# Patient Record
Sex: MALE | Race: WHITE | NOT HISPANIC OR LATINO | Employment: FULL TIME | URBAN - METROPOLITAN AREA
[De-identification: names, ages, dates, MRNs, and addresses within clinical notes are randomized per-mention and may not be internally consistent; named-entity substitution may affect disease eponyms.]

---

## 2020-06-17 ENCOUNTER — APPOINTMENT (OUTPATIENT)
Dept: RADIOLOGY | Facility: MEDICAL CENTER | Age: 25
DRG: 439 | End: 2020-06-17
Attending: EMERGENCY MEDICINE
Payer: MEDICAID

## 2020-06-17 ENCOUNTER — HOSPITAL ENCOUNTER (INPATIENT)
Facility: MEDICAL CENTER | Age: 25
LOS: 2 days | DRG: 439 | End: 2020-06-19
Attending: EMERGENCY MEDICINE | Admitting: INTERNAL MEDICINE
Payer: MEDICAID

## 2020-06-17 ENCOUNTER — APPOINTMENT (OUTPATIENT)
Dept: RADIOLOGY | Facility: MEDICAL CENTER | Age: 25
DRG: 439 | End: 2020-06-17
Attending: INTERNAL MEDICINE
Payer: MEDICAID

## 2020-06-17 DIAGNOSIS — K85.90 ACUTE PANCREATITIS, UNSPECIFIED COMPLICATION STATUS, UNSPECIFIED PANCREATITIS TYPE: ICD-10-CM

## 2020-06-17 DIAGNOSIS — K85.90 ACUTE PANCREATITIS WITHOUT INFECTION OR NECROSIS, UNSPECIFIED PANCREATITIS TYPE: ICD-10-CM

## 2020-06-17 DIAGNOSIS — R74.01 TRANSAMINITIS: ICD-10-CM

## 2020-06-17 PROBLEM — D72.829 LEUKOCYTOSIS: Status: ACTIVE | Noted: 2020-06-17

## 2020-06-17 PROBLEM — E66.9 OBESITY: Status: ACTIVE | Noted: 2020-06-17

## 2020-06-17 PROBLEM — I10 ESSENTIAL HYPERTENSION: Status: ACTIVE | Noted: 2020-06-17

## 2020-06-17 LAB
ALBUMIN SERPL BCP-MCNC: 4.5 G/DL (ref 3.2–4.9)
ALBUMIN/GLOB SERPL: 1.7 G/DL
ALP SERPL-CCNC: 99 U/L (ref 30–99)
ALT SERPL-CCNC: 169 U/L (ref 2–50)
AMPHET UR QL SCN: NEGATIVE
ANION GAP SERPL CALC-SCNC: 12 MMOL/L (ref 7–16)
AST SERPL-CCNC: 221 U/L (ref 12–45)
BARBITURATES UR QL SCN: NEGATIVE
BASOPHILS # BLD AUTO: 0.3 % (ref 0–1.8)
BASOPHILS # BLD: 0.04 K/UL (ref 0–0.12)
BENZODIAZ UR QL SCN: NEGATIVE
BILIRUB SERPL-MCNC: 1.1 MG/DL (ref 0.1–1.5)
BUN SERPL-MCNC: 14 MG/DL (ref 8–22)
BZE UR QL SCN: NEGATIVE
CALCIUM SERPL-MCNC: 9 MG/DL (ref 8.5–10.5)
CANNABINOIDS UR QL SCN: POSITIVE
CHLORIDE SERPL-SCNC: 103 MMOL/L (ref 96–112)
CHOLEST SERPL-MCNC: 125 MG/DL (ref 100–199)
CK SERPL-CCNC: 107 U/L (ref 0–154)
CO2 SERPL-SCNC: 25 MMOL/L (ref 20–33)
CORTIS SERPL-MCNC: 18.5 UG/DL (ref 0–23)
CREAT SERPL-MCNC: 0.77 MG/DL (ref 0.5–1.4)
EKG IMPRESSION: NORMAL
EOSINOPHIL # BLD AUTO: 0.22 K/UL (ref 0–0.51)
EOSINOPHIL NFR BLD: 1.7 % (ref 0–6.9)
ERYTHROCYTE [DISTWIDTH] IN BLOOD BY AUTOMATED COUNT: 41.5 FL (ref 35.9–50)
ETHANOL BLD-MCNC: <10.1 MG/DL (ref 0–10.1)
FERRITIN SERPL-MCNC: 219 NG/ML (ref 22–322)
GLOBULIN SER CALC-MCNC: 2.6 G/DL (ref 1.9–3.5)
GLUCOSE SERPL-MCNC: 138 MG/DL (ref 65–99)
HAV IGM SERPL QL IA: NORMAL
HBV CORE IGM SER QL: NORMAL
HBV SURFACE AG SER QL: NORMAL
HCT VFR BLD AUTO: 49.6 % (ref 42–52)
HCV AB SER QL: NORMAL
HDLC SERPL-MCNC: 41 MG/DL
HGB BLD-MCNC: 16.8 G/DL (ref 14–18)
IMM GRANULOCYTES # BLD AUTO: 0.05 K/UL (ref 0–0.11)
IMM GRANULOCYTES NFR BLD AUTO: 0.4 % (ref 0–0.9)
LDLC SERPL CALC-MCNC: 68 MG/DL
LIPASE SERPL-CCNC: 2660 U/L (ref 11–82)
LYMPHOCYTES # BLD AUTO: 1.86 K/UL (ref 1–4.8)
LYMPHOCYTES NFR BLD: 14.7 % (ref 22–41)
MAGNESIUM SERPL-MCNC: 2.1 MG/DL (ref 1.5–2.5)
MCH RBC QN AUTO: 30.1 PG (ref 27–33)
MCHC RBC AUTO-ENTMCNC: 33.9 G/DL (ref 33.7–35.3)
MCV RBC AUTO: 88.9 FL (ref 81.4–97.8)
METHADONE UR QL SCN: NEGATIVE
MONOCYTES # BLD AUTO: 0.66 K/UL (ref 0–0.85)
MONOCYTES NFR BLD AUTO: 5.2 % (ref 0–13.4)
NEUTROPHILS # BLD AUTO: 9.84 K/UL (ref 1.82–7.42)
NEUTROPHILS NFR BLD: 77.7 % (ref 44–72)
NRBC # BLD AUTO: 0 K/UL
NRBC BLD-RTO: 0 /100 WBC
OPIATES UR QL SCN: NEGATIVE
OXYCODONE UR QL SCN: NEGATIVE
PCP UR QL SCN: NEGATIVE
PLATELET # BLD AUTO: 192 K/UL (ref 164–446)
PMV BLD AUTO: 12.7 FL (ref 9–12.9)
POTASSIUM SERPL-SCNC: 4 MMOL/L (ref 3.6–5.5)
PROPOXYPH UR QL SCN: NEGATIVE
PROT SERPL-MCNC: 7.1 G/DL (ref 6–8.2)
RBC # BLD AUTO: 5.58 M/UL (ref 4.7–6.1)
SODIUM SERPL-SCNC: 140 MMOL/L (ref 135–145)
TRIGL SERPL-MCNC: 80 MG/DL (ref 0–149)
TSH SERPL DL<=0.005 MIU/L-ACNC: 1.76 UIU/ML (ref 0.38–5.33)
WBC # BLD AUTO: 12.7 K/UL (ref 4.8–10.8)

## 2020-06-17 PROCEDURE — 80061 LIPID PANEL: CPT

## 2020-06-17 PROCEDURE — 76705 ECHO EXAM OF ABDOMEN: CPT

## 2020-06-17 PROCEDURE — A9270 NON-COVERED ITEM OR SERVICE: HCPCS | Performed by: EMERGENCY MEDICINE

## 2020-06-17 PROCEDURE — 83735 ASSAY OF MAGNESIUM: CPT

## 2020-06-17 PROCEDURE — 84443 ASSAY THYROID STIM HORMONE: CPT

## 2020-06-17 PROCEDURE — 700105 HCHG RX REV CODE 258: Performed by: INTERNAL MEDICINE

## 2020-06-17 PROCEDURE — 700105 HCHG RX REV CODE 258: Performed by: EMERGENCY MEDICINE

## 2020-06-17 PROCEDURE — 700102 HCHG RX REV CODE 250 W/ 637 OVERRIDE(OP): Performed by: INTERNAL MEDICINE

## 2020-06-17 PROCEDURE — 99285 EMERGENCY DEPT VISIT HI MDM: CPT

## 2020-06-17 PROCEDURE — 82550 ASSAY OF CK (CPK): CPT

## 2020-06-17 PROCEDURE — 80053 COMPREHEN METABOLIC PANEL: CPT

## 2020-06-17 PROCEDURE — 82533 TOTAL CORTISOL: CPT

## 2020-06-17 PROCEDURE — 770006 HCHG ROOM/CARE - MED/SURG/GYN SEMI*

## 2020-06-17 PROCEDURE — 74181 MRI ABDOMEN W/O CONTRAST: CPT

## 2020-06-17 PROCEDURE — 93005 ELECTROCARDIOGRAM TRACING: CPT | Performed by: EMERGENCY MEDICINE

## 2020-06-17 PROCEDURE — 80074 ACUTE HEPATITIS PANEL: CPT

## 2020-06-17 PROCEDURE — A9270 NON-COVERED ITEM OR SERVICE: HCPCS | Performed by: INTERNAL MEDICINE

## 2020-06-17 PROCEDURE — 85025 COMPLETE CBC W/AUTO DIFF WBC: CPT

## 2020-06-17 PROCEDURE — 99223 1ST HOSP IP/OBS HIGH 75: CPT | Performed by: INTERNAL MEDICINE

## 2020-06-17 PROCEDURE — 80307 DRUG TEST PRSMV CHEM ANLYZR: CPT

## 2020-06-17 PROCEDURE — 83690 ASSAY OF LIPASE: CPT

## 2020-06-17 PROCEDURE — 82728 ASSAY OF FERRITIN: CPT

## 2020-06-17 PROCEDURE — 700102 HCHG RX REV CODE 250 W/ 637 OVERRIDE(OP): Performed by: EMERGENCY MEDICINE

## 2020-06-17 RX ORDER — SODIUM CHLORIDE, SODIUM LACTATE, POTASSIUM CHLORIDE, CALCIUM CHLORIDE 600; 310; 30; 20 MG/100ML; MG/100ML; MG/100ML; MG/100ML
INJECTION, SOLUTION INTRAVENOUS CONTINUOUS
Status: DISCONTINUED | OUTPATIENT
Start: 2020-06-17 | End: 2020-06-19

## 2020-06-17 RX ORDER — AMOXICILLIN 250 MG
2 CAPSULE ORAL 2 TIMES DAILY
Status: DISCONTINUED | OUTPATIENT
Start: 2020-06-17 | End: 2020-06-19 | Stop reason: HOSPADM

## 2020-06-17 RX ORDER — SODIUM CHLORIDE 9 MG/ML
2000 INJECTION, SOLUTION INTRAVENOUS ONCE
Status: COMPLETED | OUTPATIENT
Start: 2020-06-17 | End: 2020-06-17

## 2020-06-17 RX ORDER — POLYETHYLENE GLYCOL 3350 17 G/17G
1 POWDER, FOR SOLUTION ORAL
Status: DISCONTINUED | OUTPATIENT
Start: 2020-06-17 | End: 2020-06-19 | Stop reason: HOSPADM

## 2020-06-17 RX ORDER — ACETAMINOPHEN 325 MG/1
650 TABLET ORAL EVERY 6 HOURS PRN
Status: DISCONTINUED | OUTPATIENT
Start: 2020-06-17 | End: 2020-06-19 | Stop reason: HOSPADM

## 2020-06-17 RX ORDER — PROCHLORPERAZINE EDISYLATE 5 MG/ML
5-10 INJECTION INTRAMUSCULAR; INTRAVENOUS EVERY 4 HOURS PRN
Status: DISCONTINUED | OUTPATIENT
Start: 2020-06-17 | End: 2020-06-19 | Stop reason: HOSPADM

## 2020-06-17 RX ORDER — ONDANSETRON 2 MG/ML
4 INJECTION INTRAMUSCULAR; INTRAVENOUS EVERY 4 HOURS PRN
Status: DISCONTINUED | OUTPATIENT
Start: 2020-06-17 | End: 2020-06-19 | Stop reason: HOSPADM

## 2020-06-17 RX ORDER — LABETALOL HYDROCHLORIDE 5 MG/ML
10 INJECTION, SOLUTION INTRAVENOUS EVERY 4 HOURS PRN
Status: DISCONTINUED | OUTPATIENT
Start: 2020-06-17 | End: 2020-06-19 | Stop reason: HOSPADM

## 2020-06-17 RX ORDER — BISACODYL 10 MG
10 SUPPOSITORY, RECTAL RECTAL
Status: DISCONTINUED | OUTPATIENT
Start: 2020-06-17 | End: 2020-06-19 | Stop reason: HOSPADM

## 2020-06-17 RX ORDER — ENALAPRILAT 1.25 MG/ML
1.25 INJECTION INTRAVENOUS EVERY 6 HOURS PRN
Status: DISCONTINUED | OUTPATIENT
Start: 2020-06-17 | End: 2020-06-19 | Stop reason: HOSPADM

## 2020-06-17 RX ORDER — OXYCODONE HYDROCHLORIDE 5 MG/1
5 TABLET ORAL
Status: DISCONTINUED | OUTPATIENT
Start: 2020-06-17 | End: 2020-06-19 | Stop reason: HOSPADM

## 2020-06-17 RX ORDER — OXYCODONE HYDROCHLORIDE 5 MG/1
2.5 TABLET ORAL
Status: DISCONTINUED | OUTPATIENT
Start: 2020-06-17 | End: 2020-06-19 | Stop reason: HOSPADM

## 2020-06-17 RX ORDER — PROMETHAZINE HYDROCHLORIDE 25 MG/1
12.5-25 SUPPOSITORY RECTAL EVERY 4 HOURS PRN
Status: DISCONTINUED | OUTPATIENT
Start: 2020-06-17 | End: 2020-06-19 | Stop reason: HOSPADM

## 2020-06-17 RX ORDER — CLONIDINE HYDROCHLORIDE 0.1 MG/1
0.1 TABLET ORAL EVERY 6 HOURS PRN
Status: DISCONTINUED | OUTPATIENT
Start: 2020-06-17 | End: 2020-06-19 | Stop reason: HOSPADM

## 2020-06-17 RX ORDER — ONDANSETRON 4 MG/1
4 TABLET, ORALLY DISINTEGRATING ORAL EVERY 4 HOURS PRN
Status: DISCONTINUED | OUTPATIENT
Start: 2020-06-17 | End: 2020-06-19 | Stop reason: HOSPADM

## 2020-06-17 RX ORDER — HEPARIN SODIUM 5000 [USP'U]/ML
5000 INJECTION, SOLUTION INTRAVENOUS; SUBCUTANEOUS EVERY 8 HOURS
Status: DISCONTINUED | OUTPATIENT
Start: 2020-06-17 | End: 2020-06-19 | Stop reason: HOSPADM

## 2020-06-17 RX ORDER — PROMETHAZINE HYDROCHLORIDE 25 MG/1
12.5-25 TABLET ORAL EVERY 4 HOURS PRN
Status: DISCONTINUED | OUTPATIENT
Start: 2020-06-17 | End: 2020-06-19 | Stop reason: HOSPADM

## 2020-06-17 RX ORDER — MORPHINE SULFATE 4 MG/ML
2 INJECTION, SOLUTION INTRAMUSCULAR; INTRAVENOUS
Status: DISCONTINUED | OUTPATIENT
Start: 2020-06-17 | End: 2020-06-19 | Stop reason: HOSPADM

## 2020-06-17 RX ADMIN — SODIUM CHLORIDE, POTASSIUM CHLORIDE, SODIUM LACTATE AND CALCIUM CHLORIDE: 600; 310; 30; 20 INJECTION, SOLUTION INTRAVENOUS at 16:45

## 2020-06-17 RX ADMIN — SODIUM CHLORIDE, POTASSIUM CHLORIDE, SODIUM LACTATE AND CALCIUM CHLORIDE: 600; 310; 30; 20 INJECTION, SOLUTION INTRAVENOUS at 10:12

## 2020-06-17 RX ADMIN — SODIUM CHLORIDE 2000 ML: 9 INJECTION, SOLUTION INTRAVENOUS at 10:03

## 2020-06-17 RX ADMIN — LIDOCAINE HYDROCHLORIDE 30 ML: 20 SOLUTION OROPHARYNGEAL at 06:11

## 2020-06-17 RX ADMIN — DOCUSATE SODIUM 50 MG AND SENNOSIDES 8.6 MG 2 TABLET: 8.6; 5 TABLET, FILM COATED ORAL at 10:03

## 2020-06-17 RX ADMIN — DOCUSATE SODIUM 50 MG AND SENNOSIDES 8.6 MG 2 TABLET: 8.6; 5 TABLET, FILM COATED ORAL at 21:24

## 2020-06-17 RX ADMIN — SODIUM CHLORIDE, POTASSIUM CHLORIDE, SODIUM LACTATE AND CALCIUM CHLORIDE: 600; 310; 30; 20 INJECTION, SOLUTION INTRAVENOUS at 21:23

## 2020-06-17 SDOH — ECONOMIC STABILITY: FOOD INSECURITY: WITHIN THE PAST 12 MONTHS, THE FOOD YOU BOUGHT JUST DIDN'T LAST AND YOU DIDN'T HAVE MONEY TO GET MORE.: NEVER TRUE

## 2020-06-17 SDOH — HEALTH STABILITY: MENTAL HEALTH: HOW OFTEN DO YOU HAVE A DRINK CONTAINING ALCOHOL?: MONTHLY OR LESS

## 2020-06-17 SDOH — ECONOMIC STABILITY: TRANSPORTATION INSECURITY
IN THE PAST 12 MONTHS, HAS THE LACK OF TRANSPORTATION KEPT YOU FROM MEDICAL APPOINTMENTS OR FROM GETTING MEDICATIONS?: NO

## 2020-06-17 SDOH — HEALTH STABILITY: MENTAL HEALTH: HOW OFTEN DO YOU HAVE 6 OR MORE DRINKS ON ONE OCCASION?: NEVER

## 2020-06-17 SDOH — ECONOMIC STABILITY: FOOD INSECURITY: WITHIN THE PAST 12 MONTHS, YOU WORRIED THAT YOUR FOOD WOULD RUN OUT BEFORE YOU GOT MONEY TO BUY MORE.: NEVER TRUE

## 2020-06-17 SDOH — ECONOMIC STABILITY: TRANSPORTATION INSECURITY
IN THE PAST 12 MONTHS, HAS LACK OF TRANSPORTATION KEPT YOU FROM MEETINGS, WORK, OR FROM GETTING THINGS NEEDED FOR DAILY LIVING?: NO

## 2020-06-17 SDOH — HEALTH STABILITY: MENTAL HEALTH: HOW MANY STANDARD DRINKS CONTAINING ALCOHOL DO YOU HAVE ON A TYPICAL DAY?: 1 OR 2

## 2020-06-17 ASSESSMENT — ENCOUNTER SYMPTOMS
SINUS PAIN: 0
COUGH: 0
DIZZINESS: 0
ORTHOPNEA: 0
HEADACHES: 0
PALPITATIONS: 0
SORE THROAT: 0
CHILLS: 0
NAUSEA: 0
FEVER: 0
VOMITING: 0
CONSTIPATION: 0
ABDOMINAL PAIN: 1
DIARRHEA: 0
CLAUDICATION: 0
POLYDIPSIA: 0

## 2020-06-17 ASSESSMENT — LIFESTYLE VARIABLES
HAVE PEOPLE ANNOYED YOU BY CRITICIZING YOUR DRINKING: NO
DOES PATIENT WANT TO STOP DRINKING: NO
EVER HAD A DRINK FIRST THING IN THE MORNING TO STEADY YOUR NERVES TO GET RID OF A HANGOVER: NO
EVER_SMOKED: NEVER
DO YOU DRINK ALCOHOL: NO
HAVE PEOPLE ANNOYED YOU BY CRITICIZING YOUR DRINKING: NO
TOTAL SCORE: 0
ON A TYPICAL DAY WHEN YOU DRINK ALCOHOL HOW MANY DRINKS DO YOU HAVE: 1
ALCOHOL_USE: YES
TOTAL SCORE: 0
CONSUMPTION TOTAL: INCOMPLETE
TOTAL SCORE: 0
HOW MANY TIMES IN THE PAST YEAR HAVE YOU HAD 5 OR MORE DRINKS IN A DAY: 0
DOES PATIENT WANT TO STOP DRINKING: NO
EVER FELT BAD OR GUILTY ABOUT YOUR DRINKING: NO
EVER FELT BAD OR GUILTY ABOUT YOUR DRINKING: NO
HAVE YOU EVER FELT YOU SHOULD CUT DOWN ON YOUR DRINKING: NO
HAVE YOU EVER FELT YOU SHOULD CUT DOWN ON YOUR DRINKING: NO
AVERAGE NUMBER OF DAYS PER WEEK YOU HAVE A DRINK CONTAINING ALCOHOL: 0
CONSUMPTION TOTAL: NEGATIVE
TOTAL SCORE: 0
EVER HAD A DRINK FIRST THING IN THE MORNING TO STEADY YOUR NERVES TO GET RID OF A HANGOVER: NO

## 2020-06-17 ASSESSMENT — COGNITIVE AND FUNCTIONAL STATUS - GENERAL
MOBILITY SCORE: 24
SUGGESTED CMS G CODE MODIFIER MOBILITY: CH
SUGGESTED CMS G CODE MODIFIER DAILY ACTIVITY: CH
DAILY ACTIVITIY SCORE: 24

## 2020-06-17 ASSESSMENT — COPD QUESTIONNAIRES
HAVE YOU SMOKED AT LEAST 100 CIGARETTES IN YOUR ENTIRE LIFE: NO/DON'T KNOW
DO YOU EVER COUGH UP ANY MUCUS OR PHLEGM?: NO/ONLY WITH OCCASIONAL COLDS OR INFECTIONS
DURING THE PAST 4 WEEKS HOW MUCH DID YOU FEEL SHORT OF BREATH: NONE/LITTLE OF THE TIME
COPD SCREENING SCORE: 0
IN THE PAST 12 MONTHS DO YOU DO LESS THAN YOU USED TO BECAUSE OF YOUR BREATHING PROBLEMS: DISAGREE/UNSURE
COPD SCREENING SCORE: 0
DO YOU EVER COUGH UP ANY MUCUS OR PHLEGM?: NO/ONLY WITH OCCASIONAL COLDS OR INFECTIONS
HAVE YOU SMOKED AT LEAST 100 CIGARETTES IN YOUR ENTIRE LIFE: NO/DON'T KNOW
DURING THE PAST 4 WEEKS HOW MUCH DID YOU FEEL SHORT OF BREATH: NONE/LITTLE OF THE TIME

## 2020-06-17 ASSESSMENT — PATIENT HEALTH QUESTIONNAIRE - PHQ9
SUM OF ALL RESPONSES TO PHQ QUESTIONS 1-9: 6
1. LITTLE INTEREST OR PLEASURE IN DOING THINGS: NOT AT ALL
9. THOUGHTS THAT YOU WOULD BE BETTER OFF DEAD, OR OF HURTING YOURSELF: NOT AT ALL
5. POOR APPETITE OR OVEREATING: NOT AT ALL
3. TROUBLE FALLING OR STAYING ASLEEP OR SLEEPING TOO MUCH: SEVERAL DAYS
6. FEELING BAD ABOUT YOURSELF - OR THAT YOU ARE A FAILURE OR HAVE LET YOURSELF OR YOUR FAMILY DOWN: NOT AL ALL
4. FEELING TIRED OR HAVING LITTLE ENERGY: NOT AT ALL
SUM OF ALL RESPONSES TO PHQ9 QUESTIONS 1 AND 2: 1
7. TROUBLE CONCENTRATING ON THINGS, SUCH AS READING THE NEWSPAPER OR WATCHING TELEVISION: NEARLY EVERY DAY
2. FEELING DOWN, DEPRESSED, IRRITABLE, OR HOPELESS: SEVERAL DAYS
8. MOVING OR SPEAKING SO SLOWLY THAT OTHER PEOPLE COULD HAVE NOTICED. OR THE OPPOSITE, BEING SO FIGETY OR RESTLESS THAT YOU HAVE BEEN MOVING AROUND A LOT MORE THAN USUAL: SEVERAL DAYS

## 2020-06-17 ASSESSMENT — FIBROSIS 4 INDEX: FIB4 SCORE: 2.21

## 2020-06-17 NOTE — ASSESSMENT & PLAN NOTE
-Elevated AST and ALT, with normal alkaline phosphatase and total bilirubin.  Right upper quadrant ultrasound only showed hepatomegaly and echogenic liver.   Ferritin normal  Hep panel negative   MRCP just pancreatitis . No liver lesions   Possible LUCAS besides pancreatitis ??   -Trend LFTs.  -Hydrate with IV fluids as above.  Possible GI referral if worse

## 2020-06-17 NOTE — ASSESSMENT & PLAN NOTE
- Body mass index is 48.48 kg/m²..  - Counseled on weight loss.  - outpatient referral for outpatient weight management.

## 2020-06-17 NOTE — PROGRESS NOTES
Discussed with Dr. Velazquez, 24 years old male, coming acute pancreatitis vital signs are stable mild bradycardia lipase more than 2000, liver ultrasound showed fatty liver, apparently no history of alcohol abuse  Patient is assigned to Dr. Kwon who will admit.

## 2020-06-17 NOTE — ED TRIAGE NOTES
"Chief Complaint   Patient presents with   • Abdominal Pain     since 10pm last night     Pt ambulatory to triage for above complaint. Pt was trying to sleep last night when he noticed the discomfort and pain. Pt reports \"I feel really full and distended\". Pt attempted to use the bathroom to relieve the pressure feeling. Pt denies n/v/d, sob, chest pain. Pt reports last BM was this morning.     /74   Pulse (!) 50   Temp 36.1 °C (97 °F) (Temporal)   Resp 18   Ht 1.702 m (5' 7\")   Wt (!) 140.4 kg (309 lb 8.4 oz)   SpO2 100%   BMI 48.48 kg/m²     "

## 2020-06-17 NOTE — CARE PLAN
Problem: Communication  Goal: The ability to communicate needs accurately and effectively will improve  Outcome: PROGRESSING AS EXPECTED     Problem: Safety  Goal: Will remain free from injury  Outcome: PROGRESSING AS EXPECTED  Note: Fall precautions in place. Bed in lowest position. Non-skid socks in place. Personal possessions within reach. Mobility sign on door. Bed-alarm off as patient ambulates. Call light within reach. Pt educated regarding fall prevention and states understanding.

## 2020-06-17 NOTE — ASSESSMENT & PLAN NOTE
-Likely stress related from acute pancreatitis.  No signs of infection.  Continue to trend.  Hold off on antibiotics for now.  Monitor for fevers.

## 2020-06-17 NOTE — H&P
Hospital Medicine History & Physical Note    Date of Service  6/17/2020    Primary Care Physician  No primary care provider on file.    Code Status  Full Code    Chief Complaint  Chief Complaint   Patient presents with   • Abdominal Pain     since 10pm last night       History of Presenting Illness  24 y.o. male with hypertension, but not on any medications, with no history of alcohol abuse, nondiabetic, with obesity, who presented 6/17/2020 with acute onset abdominal pain.  Patient states he was doing well until last night at around 10 PM as he was trying to go to sleep, when he started to feel bloated, with onset of progressively worsening abdominal pain in the epigastrium spreading across the abdomen, with no radiation to the back, which she further described as crampy, and rated 8 out of 10 in severity, with no associated nausea, vomiting, or bowel movement changes.  He tried to go to the bathroom 5 times, but stated he was unable to have a bowel movement, and was not able to pass gas.  He had no fevers or chills.  He had no other complaint such as chest pain, shortness of breath, cough.  The pain became unbearable prompting him to go to the ED.    Notably, he had no new medications, and has not taken any alcohol for the past several weeks.  He denies using any recreational drugs.  The only thing new is he started drinking vegetable juice which he buys from [employer] last Thursday.     ED course:  The patient was initially evaluated.  Vital signs were stable.  Initial blood work-up showed WBC of 12,700 with left shift but no bandemia.  BMP was unimpressive.  ALT was 169, with AST of 221, with alkaline phosphatase and total bilirubin normal.  Lipase was 2660.  Right upper quadrant ultrasound was obtained which only showed hepatomegaly with echogenic liver.  Patient was given normal saline and GI cocktail.  He was subsequently admitted to the hospitalist service.    Review of Systems  ROS     Pertinent  positives/negatives as mentioned above.     A complete review of systems was personally done by me. All other systems were negative.     Past Medical History   has a past medical history of Hypertension.    Surgical History  Reviewed and not pertinent.       Family History  Reviewed and not pertinent.       Social History   reports that he has never smoked. He does not have any smokeless tobacco history on file. He reports previous alcohol use. He reports previous drug use.    Allergies  No Known Allergies    Medications  None       Physical Exam  Temp:  [36.1 °C (97 °F)] 36.1 °C (97 °F)  Pulse:  [50-58] 58  Resp:  [16-18] 16  BP: (120-141)/(65-74) 120/65  SpO2:  [97 %-100 %] 97 %    Physical Exam  Vitals signs reviewed.   Constitutional:       General: He is not in acute distress.     Appearance: Normal appearance. He is obese. He is not toxic-appearing or diaphoretic.      Comments: Body mass index is 48.48 kg/m².   HENT:      Head: Normocephalic and atraumatic.      Right Ear: External ear normal.      Left Ear: External ear normal.      Mouth/Throat:      Mouth: Mucous membranes are moist.      Pharynx: No oropharyngeal exudate.   Eyes:      General: No scleral icterus.     Extraocular Movements: Extraocular movements intact.      Conjunctiva/sclera: Conjunctivae normal.      Pupils: Pupils are equal, round, and reactive to light.   Neck:      Musculoskeletal: Normal range of motion and neck supple.   Cardiovascular:      Rate and Rhythm: Normal rate and regular rhythm.      Heart sounds: Normal heart sounds. No murmur. No gallop.    Pulmonary:      Effort: Pulmonary effort is normal. No respiratory distress.      Breath sounds: Normal breath sounds. No stridor. No wheezing, rhonchi or rales.   Chest:      Chest wall: No tenderness.   Abdominal:      General: Bowel sounds are normal. There is no distension.      Palpations: Abdomen is soft. There is no mass.      Tenderness: There is abdominal tenderness (  epigastrium). There is no guarding or rebound.   Musculoskeletal: Normal range of motion.         General: No swelling.      Right lower leg: No edema.      Left lower leg: No edema.   Lymphadenopathy:      Cervical: No cervical adenopathy.   Skin:     General: Skin is warm and dry.      Coloration: Skin is not jaundiced.      Findings: No rash.   Neurological:      General: No focal deficit present.      Mental Status: He is alert and oriented to person, place, and time.      Cranial Nerves: No cranial nerve deficit.   Psychiatric:         Mood and Affect: Mood normal.         Behavior: Behavior normal.         Thought Content: Thought content normal.         Judgment: Judgment normal.         Laboratory:  Recent Labs     06/17/20  0610   WBC 12.7*   RBC 5.58   HEMOGLOBIN 16.8   HEMATOCRIT 49.6   MCV 88.9   MCH 30.1   MCHC 33.9   RDW 41.5   PLATELETCT 192   MPV 12.7     Recent Labs     06/17/20  0610   SODIUM 140   POTASSIUM 4.0   CHLORIDE 103   CO2 25   GLUCOSE 138*   BUN 14   CREATININE 0.77   CALCIUM 9.0     Recent Labs     06/17/20  0610   ALTSGPT 169*   ASTSGOT 221*   ALKPHOSPHAT 99   TBILIRUBIN 1.1   LIPASE 2660*   GLUCOSE 138*         No results for input(s): NTPROBNP in the last 72 hours.  Recent Labs     06/17/20  0610   TRIGLYCERIDE 80   HDL 41   LDL 68     No results for input(s): TROPONINT in the last 72 hours.    Imaging:  US-RUQ   Final Result         1.  Hepatomegaly and echogenic liver, compatible with fatty change versus fibrosis.      [id number]    (Results Pending)         Assessment/Plan:      * Acute pancreatitis- (present on admission)  Assessment & Plan  -Cryptogenic at this point.  No history of heavy alcohol abuse.  No gallbladder stones on ultrasound.  -I will start him on supportive care with IV fluid hydration with LR at 200 cc/h, along with analgesics with oral oxycodone, and IV morphine, and as needed antiemetics.  I will put him on bowel rest as well.  -For further work-up, I will  obtain an MRCP.  I will also check a blood alcohol level, and urine drug screen.  -Trend lipase level.    Transaminitis- (present on admission)  Assessment & Plan  -Elevated AST and ALT, with normal alkaline phosphatase and total bilirubin.  Right upper quadrant ultrasound only showed hepatomegaly and echogenic liver.  I will obtain an MRCP.  For completion, I will send for CPK, TSH, viral hepatitis panel, ferritin, and cortisol.  -Trend LFTs.  -Hydrate with IV fluids as above.    Leukocytosis- (present on admission)  Assessment & Plan  -Likely stress related from acute pancreatitis.  No signs of infection.  Continue to trend.  Hold off on antibiotics for now.  Monitor for fevers.    Obesity- (present on admission)  Assessment & Plan  - Body mass index is 48.48 kg/m²..  - Counseled on weight loss.  - outpatient referral for outpatient weight management.    Essential hypertension- (present on admission)  Assessment & Plan  -He is not on any antihypertensives.  Monitor blood pressure trend closely with as needed IV labetalol, Vasotec, and oral clonidine for significant hypertension.      DVT prophylaxis: Heparin SQ

## 2020-06-17 NOTE — ASSESSMENT & PLAN NOTE
-Cryptogenic at this point.  No history of heavy alcohol abuse.  No gallbladder stones on ultrasound.  -lipase 2660 on admission, 6/18- 268   - MRCP negative   Hep panel negative. TSH normal   Lipid panel unremarkable   u-tox cannabinoids. Possible ??   Not using pain meds   Advance diet. Continue IVF.    Consider GI referral if LFT worsen

## 2020-06-17 NOTE — ED NOTES
Med Rec completed per phone interview with Pt  Allergies reviewed  No ABX in last 14 days    Pt denies taking any RX's or OTC's

## 2020-06-17 NOTE — ED PROVIDER NOTES
ED Provider Note    CHIEF COMPLAINT  Chief Complaint   Patient presents with   • Abdominal Pain     since 10pm last night       EDGARD Goodrich is a 24 y.o. male who presents with abdominal pain.  Started last night at about 10 PM.  Maximal in the central epigastrium.  Feels like a bloating sensation.  The pain radiates throughout his abdomen.  No radiation of the back.  No chest pain or shortness of breath.  He has not had any nausea, vomiting or diarrhea.  No fevers or chills.  Normal bowel movement yesterday.  Does not feel constipated.  No dysuria hematuria frequency.  Denies drinking alcohol.  No drugs.    REVIEW OF SYSTEMS  Review of Systems   Constitutional: Negative for chills and fever.   HENT: Negative for congestion, sinus pain and sore throat.    Respiratory: Negative for cough.    Cardiovascular: Negative for chest pain, palpitations, orthopnea, claudication and leg swelling.   Gastrointestinal: Positive for abdominal pain. Negative for constipation, diarrhea, nausea and vomiting.   Genitourinary: Negative for dysuria.   Skin: Negative for rash.   Neurological: Negative for dizziness and headaches.   Endo/Heme/Allergies: Negative for polydipsia.   All other systems reviewed and are negative.        PAST MEDICAL HISTORY  Past Medical History:   Diagnosis Date   • Hypertension        SOCIAL HISTORY  Social History     Tobacco Use   • Smoking status: Never Smoker   Substance Use Topics   • Alcohol use: Not Currently   • Drug use: Not Currently       SURGICAL HISTORY  History reviewed. No pertinent surgical history.    CURRENT MEDICATIONS  Home Medications     Reviewed by Netta Bejarano R.N. (Registered Nurse) on 06/17/20 at 0528  Med List Status: Not Addressed   Medication Last Dose Status        Patient Clayton Taking any Medications                       ALLERGIES  No Known Allergies    PHYSICAL EXAM  VITAL SIGNS: /71   Pulse (!) 53   Temp 36.1 °C (97 °F) (Temporal)   Resp 16   Ht 1.702 m (5'  "7\")   Wt (!) 140.4 kg (309 lb 8.4 oz)   SpO2 99%   BMI 48.48 kg/m²    Constitutional: Awake and alert  HENT:  Atraumatic, Normocephalic.Oropharynx moist mucus membranes, Nose normal inspection.   Eyes: Normal inspection  Neck: Supple  Cardiovascular: Normal heart rate, Normal rhythm.  Symmetric peripheral pulses.   Thorax & Lungs: No respiratory distress, No wheezing, No rales, No rhonchi, No chest tenderness.   Abdomen: Bowel sounds normal, soft, non-distended, nontender, no mass  Skin: Warm, Dry, No rash.   Back: No tenderness, No CVA tenderness.   Extremities: No clubbing, cyanosis, edema, no Homans or cords   Neurologic: Grossly normal   Psychiatric: Anxious appearing    RADIOLOGY/PROCEDURES  US-RUQ   Final Result         1.  Hepatomegaly and echogenic liver, compatible with fatty change versus fibrosis.           Imaging is interpreted by radiologist    Labs:  Results for orders placed or performed during the hospital encounter of 06/17/20   CBC WITH DIFFERENTIAL   Result Value Ref Range    WBC 12.7 (H) 4.8 - 10.8 K/uL    RBC 5.58 4.70 - 6.10 M/uL    Hemoglobin 16.8 14.0 - 18.0 g/dL    Hematocrit 49.6 42.0 - 52.0 %    MCV 88.9 81.4 - 97.8 fL    MCH 30.1 27.0 - 33.0 pg    MCHC 33.9 33.7 - 35.3 g/dL    RDW 41.5 35.9 - 50.0 fL    Platelet Count 192 164 - 446 K/uL    MPV 12.7 9.0 - 12.9 fL    Neutrophils-Polys 77.70 (H) 44.00 - 72.00 %    Lymphocytes 14.70 (L) 22.00 - 41.00 %    Monocytes 5.20 0.00 - 13.40 %    Eosinophils 1.70 0.00 - 6.90 %    Basophils 0.30 0.00 - 1.80 %    Immature Granulocytes 0.40 0.00 - 0.90 %    Nucleated RBC 0.00 /100 WBC    Neutrophils (Absolute) 9.84 (H) 1.82 - 7.42 K/uL    Lymphs (Absolute) 1.86 1.00 - 4.80 K/uL    Monos (Absolute) 0.66 0.00 - 0.85 K/uL    Eos (Absolute) 0.22 0.00 - 0.51 K/uL    Baso (Absolute) 0.04 0.00 - 0.12 K/uL    Immature Granulocytes (abs) 0.05 0.00 - 0.11 K/uL    NRBC (Absolute) 0.00 K/uL   COMP METABOLIC PANEL   Result Value Ref Range    Sodium 140 135 - 145 " mmol/L    Potassium 4.0 3.6 - 5.5 mmol/L    Chloride 103 96 - 112 mmol/L    Co2 25 20 - 33 mmol/L    Anion Gap 12.0 7.0 - 16.0    Glucose 138 (H) 65 - 99 mg/dL    Bun 14 8 - 22 mg/dL    Creatinine 0.77 0.50 - 1.40 mg/dL    Calcium 9.0 8.5 - 10.5 mg/dL    AST(SGOT) 221 (H) 12 - 45 U/L    ALT(SGPT) 169 (H) 2 - 50 U/L    Alkaline Phosphatase 99 30 - 99 U/L    Total Bilirubin 1.1 0.1 - 1.5 mg/dL    Albumin 4.5 3.2 - 4.9 g/dL    Total Protein 7.1 6.0 - 8.2 g/dL    Globulin 2.6 1.9 - 3.5 g/dL    A-G Ratio 1.7 g/dL   LIPASE   Result Value Ref Range    Lipase 2660 (H) 11 - 82 U/L   ESTIMATED GFR   Result Value Ref Range    GFR If African American >60 >60 mL/min/1.73 m 2    GFR If Non African American >60 >60 mL/min/1.73 m 2   EKG (Now)   Result Value Ref Range    Report       [facility]    Test Date:  2020  Pt Name:    YARON JOHN                 Department: ER  MRN:        [id number]                      Room:       [facility]  Gender:     Male                         Technician: [id number]  :        1995                   Requested By:NOA RAZA  Order #:    [id number]                    Reading MD: NOA RAZA MD    Measurements  Intervals                                Axis  Rate:       59                           P:          16  LA:         148                          QRS:        3  QRSD:       106                          T:          6  QT:         408  QTc:        405    Interpretive Statements  SINUS BRADYCARDIA  PROBABLE LEFT VENTRICULAR HYPERTROPHY  No previous ECG available for comparison  Electronically Signed On 2020 6:37:26 PDT by NOA RAZA MD         Medications   NS infusion 2,000 mL (has no administration in time range)   hyoscyamine-maalox plus-lidocaine viscous (GI COCKTAIL) oral susp 30 mL (30 mL Oral Given 20 0611)       HYDRATION: Based on the patient's presentation of Other Pancreatitis the patient was given IV fluids. IV Hydration was  used because oral hydration was not adequate alone. Upon recheck following hydration, the patient was Stable.    COURSE & MEDICAL DECISION MAKING  Patient presents with vague epigastric abdominal pain.  His vital signs were stable.  Nonsurgical physical exam.  He was given a GI cocktail with no remarkable improvement although his symptoms are mild and he declined additional analgesic.  Laboratory data was ordered.  He is identified to have significant pancreatitis.  He does not have evidence of biliary obstruction on ultrasound.  He does not drink alcohol.  I sent a lipid panel which is pending.  The patient will be admitted to the hospital for further treatment.  Hospitalist was consulted.    FINAL IMPRESSION  1.  Acute pancreatitis      This dictation was created using voice recognition software. The accuracy of the dictation is limited to the abilities of the software.  The nursing notes were reviewed and certain aspects of this information were incorporated into this note.      Electronically signed by: Seth Velazquez M.D., 6/17/2020 6:35 AM

## 2020-06-17 NOTE — PROGRESS NOTES
Received report from ED RN and assumed care of patient. Patient is A&O x 4. Admit profile completed. Patient reports soreness in his abdomen but not actual pain. Patient declines medication.  Plan of care explained. MRI screening complete. Patient has no questions at this time. No signs of distress.Bed is in lowest, locked position, call light and belongings are within reach. Patient does not call for assistance and bed alarm is off as patient ambulates. Patient currently off floor for MRI.   All other needs met.

## 2020-06-18 LAB
ALBUMIN SERPL BCP-MCNC: 3.8 G/DL (ref 3.2–4.9)
ALP SERPL-CCNC: 149 U/L (ref 30–99)
ALT SERPL-CCNC: 591 U/L (ref 2–50)
ANION GAP SERPL CALC-SCNC: 10 MMOL/L (ref 7–16)
AST SERPL-CCNC: 275 U/L (ref 12–45)
BASOPHILS # BLD AUTO: 0.3 % (ref 0–1.8)
BASOPHILS # BLD: 0.03 K/UL (ref 0–0.12)
BILIRUB CONJ SERPL-MCNC: 0.6 MG/DL (ref 0.1–0.5)
BILIRUB INDIRECT SERPL-MCNC: 1.4 MG/DL (ref 0–1)
BILIRUB SERPL-MCNC: 2 MG/DL (ref 0.1–1.5)
BUN SERPL-MCNC: 7 MG/DL (ref 8–22)
CALCIUM SERPL-MCNC: 8.8 MG/DL (ref 8.5–10.5)
CHLORIDE SERPL-SCNC: 102 MMOL/L (ref 96–112)
CO2 SERPL-SCNC: 22 MMOL/L (ref 20–33)
CREAT SERPL-MCNC: 0.63 MG/DL (ref 0.5–1.4)
EOSINOPHIL # BLD AUTO: 0.21 K/UL (ref 0–0.51)
EOSINOPHIL NFR BLD: 1.8 % (ref 0–6.9)
ERYTHROCYTE [DISTWIDTH] IN BLOOD BY AUTOMATED COUNT: 41 FL (ref 35.9–50)
GLUCOSE SERPL-MCNC: 85 MG/DL (ref 65–99)
HCT VFR BLD AUTO: 48.9 % (ref 42–52)
HGB BLD-MCNC: 16.5 G/DL (ref 14–18)
IMM GRANULOCYTES # BLD AUTO: 0.04 K/UL (ref 0–0.11)
IMM GRANULOCYTES NFR BLD AUTO: 0.4 % (ref 0–0.9)
LIPASE SERPL-CCNC: 240 U/L (ref 11–82)
LYMPHOCYTES # BLD AUTO: 2 K/UL (ref 1–4.8)
LYMPHOCYTES NFR BLD: 17.6 % (ref 22–41)
MCH RBC QN AUTO: 29.8 PG (ref 27–33)
MCHC RBC AUTO-ENTMCNC: 33.7 G/DL (ref 33.7–35.3)
MCV RBC AUTO: 88.3 FL (ref 81.4–97.8)
MONOCYTES # BLD AUTO: 0.68 K/UL (ref 0–0.85)
MONOCYTES NFR BLD AUTO: 6 % (ref 0–13.4)
NEUTROPHILS # BLD AUTO: 8.41 K/UL (ref 1.82–7.42)
NEUTROPHILS NFR BLD: 73.9 % (ref 44–72)
NRBC # BLD AUTO: 0 K/UL
NRBC BLD-RTO: 0 /100 WBC
PLATELET # BLD AUTO: 176 K/UL (ref 164–446)
PMV BLD AUTO: 12.5 FL (ref 9–12.9)
POTASSIUM SERPL-SCNC: 3.4 MMOL/L (ref 3.6–5.5)
PROT SERPL-MCNC: 6.6 G/DL (ref 6–8.2)
RBC # BLD AUTO: 5.54 M/UL (ref 4.7–6.1)
SODIUM SERPL-SCNC: 134 MMOL/L (ref 135–145)
WBC # BLD AUTO: 11.4 K/UL (ref 4.8–10.8)

## 2020-06-18 PROCEDURE — 99233 SBSQ HOSP IP/OBS HIGH 50: CPT | Performed by: INTERNAL MEDICINE

## 2020-06-18 PROCEDURE — 85025 COMPLETE CBC W/AUTO DIFF WBC: CPT

## 2020-06-18 PROCEDURE — 770006 HCHG ROOM/CARE - MED/SURG/GYN SEMI*

## 2020-06-18 PROCEDURE — A9270 NON-COVERED ITEM OR SERVICE: HCPCS | Performed by: INTERNAL MEDICINE

## 2020-06-18 PROCEDURE — 700102 HCHG RX REV CODE 250 W/ 637 OVERRIDE(OP): Performed by: INTERNAL MEDICINE

## 2020-06-18 PROCEDURE — 36415 COLL VENOUS BLD VENIPUNCTURE: CPT

## 2020-06-18 PROCEDURE — 80076 HEPATIC FUNCTION PANEL: CPT

## 2020-06-18 PROCEDURE — 80048 BASIC METABOLIC PNL TOTAL CA: CPT

## 2020-06-18 PROCEDURE — 700105 HCHG RX REV CODE 258: Performed by: INTERNAL MEDICINE

## 2020-06-18 PROCEDURE — 83690 ASSAY OF LIPASE: CPT

## 2020-06-18 RX ADMIN — SODIUM CHLORIDE, POTASSIUM CHLORIDE, SODIUM LACTATE AND CALCIUM CHLORIDE: 600; 310; 30; 20 INJECTION, SOLUTION INTRAVENOUS at 02:23

## 2020-06-18 RX ADMIN — DOCUSATE SODIUM 50 MG AND SENNOSIDES 8.6 MG 2 TABLET: 8.6; 5 TABLET, FILM COATED ORAL at 05:43

## 2020-06-18 RX ADMIN — SODIUM CHLORIDE, POTASSIUM CHLORIDE, SODIUM LACTATE AND CALCIUM CHLORIDE: 600; 310; 30; 20 INJECTION, SOLUTION INTRAVENOUS at 08:48

## 2020-06-18 RX ADMIN — SODIUM CHLORIDE, POTASSIUM CHLORIDE, SODIUM LACTATE AND CALCIUM CHLORIDE: 600; 310; 30; 20 INJECTION, SOLUTION INTRAVENOUS at 14:30

## 2020-06-18 RX ADMIN — SODIUM CHLORIDE, POTASSIUM CHLORIDE, SODIUM LACTATE AND CALCIUM CHLORIDE: 600; 310; 30; 20 INJECTION, SOLUTION INTRAVENOUS at 20:00

## 2020-06-18 ASSESSMENT — ENCOUNTER SYMPTOMS
PALPITATIONS: 0
CHILLS: 0
VOMITING: 0
HEADACHES: 0
DIZZINESS: 0
BLURRED VISION: 0
DIARRHEA: 0
CONSTIPATION: 0
BACK PAIN: 0
HEARTBURN: 0
ABDOMINAL PAIN: 1
COUGH: 0
SORE THROAT: 0
BLOOD IN STOOL: 0
NAUSEA: 0
SHORTNESS OF BREATH: 0
FEVER: 0
DOUBLE VISION: 0
SPUTUM PRODUCTION: 0

## 2020-06-18 NOTE — PROGRESS NOTES
2 RN skin check complete with: Roberto Mejia RN.   Devices in place: PIV.  Skin assessed under devices: Yes.  Confirmed pressure ulcers found on: N/A.  New potential pressure ulcers noted on: N/A. Wound consult placed: No.  The following interventions in place: Pressure distribution mattress, 2RN skin check, pillows in place for support and positioning    Ears: intact  Elbows: intact  Chest/Back:dry patch on upper back  Lower Extremities: intact  Heels: dry, blanching

## 2020-06-18 NOTE — DIETARY
NUTRITION SERVICES: BMI - Pt with BMI >40 (=Body mass index is 48.48 kg/m².), morbid obesity. Weight loss counseling not appropriate in acute care setting. RECOMMEND - Referral to outpatient nutrition services for weight management after D/C.

## 2020-06-18 NOTE — CARE PLAN
Problem: Safety  Goal: Will remain free from falls  Outcome: PROGRESSING AS EXPECTED     Problem: Fluid Volume:  Goal: Will maintain balanced intake and output  Outcome: PROGRESSING AS EXPECTED  Note: Administer fluids per MAR.

## 2020-06-18 NOTE — PROGRESS NOTES
Hospital Medicine Daily Progress Note    Date of Service  6/18/2020    Chief Complaint  24 y.o. male admitted 6/17/2020 with abdominal pain     Hospital Course    24-year-old male past medical history of hypertension, obesity easily complaining of abdominal pain, bloating, abdominal pain at epigastrium area. he denied radiation to her back, severity 8 out of 10.  He denied nausea, vomiting or bowel movement changes.  He does not drink alcohol.  He denies drug use.  Lipase 2660 on admission.  First, ALT, AST doubled following day.  Mild leukocytosis.  MRCP negative for obstruction.  Pain did resolve 6/18.  hepatitis panel negative. Abdominal US Hepatomegaly and echogenic liver, compatible with fatty change versus fibrosis.        Interval Problem Update  Pt eager to go home. No pain. No nausea. U tox positive for cannabis. No alcohol.  LFT doubled today. Ferritin normal. Lipid cannot explain acute pancreatitis.     Consultants/Specialty  None     Code Status  Full code     Disposition  Inpatient     Review of Systems  Review of Systems   Constitutional: Negative for chills, fever and malaise/fatigue.   HENT: Negative for congestion and sore throat.    Eyes: Negative for blurred vision and double vision.   Respiratory: Negative for cough, sputum production and shortness of breath.    Cardiovascular: Negative for chest pain, palpitations and leg swelling.   Gastrointestinal: Positive for abdominal pain. Negative for blood in stool, constipation, diarrhea, heartburn, nausea and vomiting.   Genitourinary: Negative for dysuria and urgency.   Musculoskeletal: Negative for back pain.   Neurological: Negative for dizziness and headaches.        Physical Exam  Temp:  [36.7 °C (98.1 °F)-36.9 °C (98.4 °F)] 36.8 °C (98.2 °F)  Pulse:  [56-67] 60  Resp:  [14-16] 16  BP: (111-144)/(56-87) 129/79  SpO2:  [95 %-98 %] 96 %    Physical Exam  Vitals signs and nursing note reviewed.   Constitutional:       Appearance: Normal appearance.  He is obese. He is ill-appearing.   HENT:      Head: Normocephalic and atraumatic.      Right Ear: External ear normal.      Left Ear: External ear normal.   Eyes:      General: Scleral icterus present.   Cardiovascular:      Rate and Rhythm: Normal rate.      Heart sounds: No murmur.   Pulmonary:      Effort: Pulmonary effort is normal. No respiratory distress.      Breath sounds: No wheezing or rales.   Abdominal:      General: There is no distension.      Palpations: Abdomen is soft.      Tenderness: There is abdominal tenderness. There is no guarding.   Musculoskeletal:         General: No swelling or deformity.   Skin:     General: Skin is dry.      Coloration: Skin is not jaundiced.   Neurological:      General: No focal deficit present.      Mental Status: He is alert and oriented to person, place, and time.      Cranial Nerves: No cranial nerve deficit.   Psychiatric:         Mood and Affect: Mood normal.         Behavior: Behavior normal.         Thought Content: Thought content normal.         Judgment: Judgment normal.         Fluids    Intake/Output Summary (Last 24 hours) at 6/18/2020 1415  Last data filed at 6/18/2020 0630  Gross per 24 hour   Intake 2400 ml   Output --   Net 2400 ml       Laboratory  Recent Labs     06/17/20  0610 06/18/20  0246   WBC 12.7* 11.4*   RBC 5.58 5.54   HEMOGLOBIN 16.8 16.5   HEMATOCRIT 49.6 48.9   MCV 88.9 88.3   MCH 30.1 29.8   MCHC 33.9 33.7   RDW 41.5 41.0   PLATELETCT 192 176   MPV 12.7 12.5     Recent Labs     06/17/20  0610 06/18/20  0246   SODIUM 140 134*   POTASSIUM 4.0 3.4*   CHLORIDE 103 102   CO2 25 22   GLUCOSE 138* 85   BUN 14 7*   CREATININE 0.77 0.63   CALCIUM 9.0 8.8             Recent Labs     06/17/20  0610   TRIGLYCERIDE 80   HDL 41   LDL 68       Imaging  [id number]   Final Result         1. No intrahepatic or extrahepatic bile duct dilation or stone seen.      2. Unremarkable gallbladder. No gallstone.      3. Heterogeneous pancreas with  surrounding fluid, concerning for pancreatitis. Correlate with lipase.      US-RUQ   Final Result         1.  Hepatomegaly and echogenic liver, compatible with fatty change versus fibrosis.           Assessment/Plan  * Acute pancreatitis- (present on admission)  Assessment & Plan  -Cryptogenic at this point.  No history of heavy alcohol abuse.  No gallbladder stones on ultrasound.  -lipase 2660 on admission, 6/18- 268   - MRCP negative   Hep panel negative. TSH normal   Lipid panel unremarkable   u-tox cannabinoids. Possible ??   Not using pain meds   Advance diet. Continue IVF.    Consider GI referral if LFT worsen     Transaminitis- (present on admission)  Assessment & Plan  -Elevated AST and ALT, with normal alkaline phosphatase and total bilirubin.  Right upper quadrant ultrasound only showed hepatomegaly and echogenic liver.   Ferritin normal  Hep panel negative   MRCP just pancreatitis . No liver lesions   Possible LUCAS besides pancreatitis ??   -Trend LFTs.  -Hydrate with IV fluids as above.  Possible GI referral if worse     Leukocytosis- (present on admission)  Assessment & Plan  -Likely stress related from acute pancreatitis.  No signs of infection.  Continue to trend.  Hold off on antibiotics for now.  Monitor for fevers.    Obesity- (present on admission)  Assessment & Plan  - Body mass index is 48.48 kg/m²..  - Counseled on weight loss.  - outpatient referral for outpatient weight management.    Essential hypertension- (present on admission)  Assessment & Plan  conservative measurement.  Continue to follow-up with primary care       VTE prophylaxis: heparin

## 2020-06-18 NOTE — PROGRESS NOTES
Pt is A&Ox4; room air. Denies any pain or discomfort at this moment. Assessment complete. Pt is upself, and calls for help as needed. Pt is hoping to see MD this morning for them to explain recent test results. This RN informed the pt the MD would be in to see pt this morning during rounds. POC discussed with pt; all questions answered at this time. Fall precautions in place. Call light within reach. Pt educated regarding fall prevention and states understanding. Hourly rounding in place. All needs met at this moment.

## 2020-06-18 NOTE — CARE PLAN
Problem: Safety  Goal: Will remain free from injury  Outcome: PROGRESSING AS EXPECTED  Note: Fall precautions in place. Bed in lowest position. Non-skid socks in place. Personal possessions within reach. Call light within reach. Pt educated regarding fall prevention and states understanding.      Problem: Knowledge Deficit  Goal: Knowledge of disease process/condition, treatment plan, diagnostic tests, and medications will improve  Outcome: PROGRESSING AS EXPECTED  Note: Pt educated regarding plan of care and medications. All questions answered. Pt reaches out appropriately and as needed for clarification and for details on POC. Pt is well involved of POC and aware of current POC.

## 2020-06-18 NOTE — PROGRESS NOTES
Received bedside report and assumed care. Pt AOx 3, up self, continent of bladder and bowel, and on room air.  PIV in place on left AC running LR at 200 ml/hr.  Pt stated feels bloated but denies pain. VSS. Reviewed plan of care and questions answered. Pt belongings and call light in reach, bed locked/lowest position, and pt now watching TV.

## 2020-06-18 NOTE — PROGRESS NOTES
Spiritual Care Note        Patient's Name: Todd Goodrich   MRN: [id number]    YOB: 1995   Age and Gender: 24 y.o. male   Unit: Med/Neph   Room (and Bed): S630/2   Ethnicity or Nationality:    Primary Language: English   Mu-ism/Spiritual Preference: Yazidism   Place of Residence: [place]   Medical Diagnoses: acute pancreatitis  transaminitis  leukocytosis  obesity  essential hypertension   Code Status: Full Code    Date of Admission: 6/17/2020   Length of Stay: 1 day        Spiritual Screen Results:  Is your spiritual health or inner well-being important to you as you cope with your medical condition?   Yes  Would you like to receive a visit from our Spiritual Care team or your own Catholic or spiritual leader?   Yes  Was spiritual care education provided to the patient?   Declined    Sources of Hope/Gela:   Art/Music, Companionship, Nature, Meditation, Higher Power, Excercise/Physical Activity       Visited With:   Patient    Nature of the Visit:   Initial, On shift    General Visit:   Yes    Referral From/Origin of Request:   Epic nursing    Observations/Symptoms:   Patient sitting up in chair, alert, pleasant.    Interaction/Conversation:   Patient asked for prayer for himself, as they try to determine what's causing his abdominal pain, and for both of his parents, one in [place], one in [place], who are dealing with difficult situations.    Assessment:   Need    Need:   Seeking Spiritual Assistance and Support    Mu-ism Needs:   Prayer    Interventions:   compassionate presence, emotional support, prayer.    Outcomes:   Progress with Trust, Spiritual Comfort    Total Time:   10 minutes      Spiritual Care Provider Information:  Chaplain CALEB Hernandez  [phone]   [email or web address]

## 2020-06-19 ENCOUNTER — PATIENT OUTREACH (OUTPATIENT)
Dept: HEALTH INFORMATION MANAGEMENT | Facility: OTHER | Age: 25
End: 2020-06-19

## 2020-06-19 VITALS
BODY MASS INDEX: 48.58 KG/M2 | TEMPERATURE: 97.4 F | OXYGEN SATURATION: 98 % | HEIGHT: 67 IN | HEART RATE: 57 BPM | DIASTOLIC BLOOD PRESSURE: 83 MMHG | WEIGHT: 309.53 LBS | RESPIRATION RATE: 16 BRPM | SYSTOLIC BLOOD PRESSURE: 131 MMHG

## 2020-06-19 PROBLEM — K85.90 ACUTE PANCREATITIS: Status: RESOLVED | Noted: 2020-06-17 | Resolved: 2020-06-19

## 2020-06-19 PROBLEM — D72.829 LEUKOCYTOSIS: Status: RESOLVED | Noted: 2020-06-17 | Resolved: 2020-06-19

## 2020-06-19 LAB
ALBUMIN SERPL BCP-MCNC: 3.7 G/DL (ref 3.2–4.9)
ALBUMIN/GLOB SERPL: 1.4 G/DL
ALP SERPL-CCNC: 132 U/L (ref 30–99)
ALT SERPL-CCNC: 341 U/L (ref 2–50)
ANION GAP SERPL CALC-SCNC: 9 MMOL/L (ref 7–16)
AST SERPL-CCNC: 73 U/L (ref 12–45)
BASOPHILS # BLD AUTO: 0.4 % (ref 0–1.8)
BASOPHILS # BLD: 0.03 K/UL (ref 0–0.12)
BILIRUB SERPL-MCNC: 1.1 MG/DL (ref 0.1–1.5)
BUN SERPL-MCNC: 6 MG/DL (ref 8–22)
CALCIUM SERPL-MCNC: 8.9 MG/DL (ref 8.5–10.5)
CHLORIDE SERPL-SCNC: 99 MMOL/L (ref 96–112)
CO2 SERPL-SCNC: 23 MMOL/L (ref 20–33)
CREAT SERPL-MCNC: 0.69 MG/DL (ref 0.5–1.4)
EOSINOPHIL # BLD AUTO: 0.38 K/UL (ref 0–0.51)
EOSINOPHIL NFR BLD: 4.8 % (ref 0–6.9)
ERYTHROCYTE [DISTWIDTH] IN BLOOD BY AUTOMATED COUNT: 41.6 FL (ref 35.9–50)
GLOBULIN SER CALC-MCNC: 2.7 G/DL (ref 1.9–3.5)
GLUCOSE SERPL-MCNC: 78 MG/DL (ref 65–99)
HCT VFR BLD AUTO: 47 % (ref 42–52)
HGB BLD-MCNC: 15.7 G/DL (ref 14–18)
IMM GRANULOCYTES # BLD AUTO: 0.03 K/UL (ref 0–0.11)
IMM GRANULOCYTES NFR BLD AUTO: 0.4 % (ref 0–0.9)
LIPASE SERPL-CCNC: 122 U/L (ref 11–82)
LYMPHOCYTES # BLD AUTO: 2.74 K/UL (ref 1–4.8)
LYMPHOCYTES NFR BLD: 34.5 % (ref 22–41)
MCH RBC QN AUTO: 30.1 PG (ref 27–33)
MCHC RBC AUTO-ENTMCNC: 33.4 G/DL (ref 33.7–35.3)
MCV RBC AUTO: 90 FL (ref 81.4–97.8)
MONOCYTES # BLD AUTO: 0.57 K/UL (ref 0–0.85)
MONOCYTES NFR BLD AUTO: 7.2 % (ref 0–13.4)
NEUTROPHILS # BLD AUTO: 4.19 K/UL (ref 1.82–7.42)
NEUTROPHILS NFR BLD: 52.7 % (ref 44–72)
NRBC # BLD AUTO: 0 K/UL
NRBC BLD-RTO: 0 /100 WBC
PLATELET # BLD AUTO: 166 K/UL (ref 164–446)
PMV BLD AUTO: 12.9 FL (ref 9–12.9)
POTASSIUM SERPL-SCNC: 3.5 MMOL/L (ref 3.6–5.5)
PROT SERPL-MCNC: 6.4 G/DL (ref 6–8.2)
RBC # BLD AUTO: 5.22 M/UL (ref 4.7–6.1)
SODIUM SERPL-SCNC: 131 MMOL/L (ref 135–145)
WBC # BLD AUTO: 7.9 K/UL (ref 4.8–10.8)

## 2020-06-19 PROCEDURE — A9270 NON-COVERED ITEM OR SERVICE: HCPCS | Performed by: INTERNAL MEDICINE

## 2020-06-19 PROCEDURE — 700105 HCHG RX REV CODE 258: Performed by: INTERNAL MEDICINE

## 2020-06-19 PROCEDURE — 99239 HOSP IP/OBS DSCHRG MGMT >30: CPT | Performed by: INTERNAL MEDICINE

## 2020-06-19 PROCEDURE — 700102 HCHG RX REV CODE 250 W/ 637 OVERRIDE(OP): Performed by: INTERNAL MEDICINE

## 2020-06-19 PROCEDURE — 80053 COMPREHEN METABOLIC PANEL: CPT

## 2020-06-19 PROCEDURE — 85025 COMPLETE CBC W/AUTO DIFF WBC: CPT

## 2020-06-19 PROCEDURE — 83690 ASSAY OF LIPASE: CPT

## 2020-06-19 PROCEDURE — 36415 COLL VENOUS BLD VENIPUNCTURE: CPT

## 2020-06-19 RX ORDER — POTASSIUM CHLORIDE 20 MEQ/1
40 TABLET, EXTENDED RELEASE ORAL ONCE
Status: COMPLETED | OUTPATIENT
Start: 2020-06-19 | End: 2020-06-19

## 2020-06-19 RX ADMIN — POTASSIUM CHLORIDE 40 MEQ: 1500 TABLET, EXTENDED RELEASE ORAL at 09:28

## 2020-06-19 RX ADMIN — SODIUM CHLORIDE, POTASSIUM CHLORIDE, SODIUM LACTATE AND CALCIUM CHLORIDE: 600; 310; 30; 20 INJECTION, SOLUTION INTRAVENOUS at 00:44

## 2020-06-19 RX ADMIN — SODIUM CHLORIDE, POTASSIUM CHLORIDE, SODIUM LACTATE AND CALCIUM CHLORIDE: 600; 310; 30; 20 INJECTION, SOLUTION INTRAVENOUS at 05:33

## 2020-06-19 RX ADMIN — DOCUSATE SODIUM 50 MG AND SENNOSIDES 8.6 MG 2 TABLET: 8.6; 5 TABLET, FILM COATED ORAL at 05:31

## 2020-06-19 NOTE — PROGRESS NOTES
Received report and assumed care. Pt AOx 4, mobile with no assist, and on room air.  PIV in place on left AC running LR at 200 ml/hr.  Pt reporting 0/10 pain, and VSS.  Pt tolerating full liquid diet. Reviewed plan of care and questions answered. Pt belongings and call light in reach, bed locked/lowest position, and pt now resting in bed.

## 2020-06-19 NOTE — PROGRESS NOTES
Received report and assumed care of pt. Assessment complete on RA. Pt A&OX4. Denies any pain or discomfort at this time. Pt aware of discharge today. All pt needs met at this time. Safety precautions and hourly rounding in place.

## 2020-06-19 NOTE — CARE PLAN
Problem: Communication  Goal: The ability to communicate needs accurately and effectively will improve  Outcome: PROGRESSING AS EXPECTED  Note: Encouraged pt to voice feelings.     Problem: Safety  Goal: Will remain free from injury  Outcome: PROGRESSING AS EXPECTED  Note: Bed locked and in lowest position. Call light and belongings within reach.

## 2020-06-19 NOTE — DISCHARGE PLANNING
Care Transition Team Assessment  Per MD's Notes:  24 y.o. male with hypertension, but not on any medications, with no history of alcohol abuse, nondiabetic, with obesity, who presented 6/17/2020 with acute onset abdominal pain.    This case manger spoke with patient, he is alert and oriented x4, is discharging today with outpatient follow up to GI. Patient lost >100 lbs. In less than a year. He is committed to loosing weight, eats healthy and goes to the gym regularly.     PCP: [facility]  Rx: [facility]  HH: [facility]  SNF: [facility]  DME: [facility], states he had a sleep study done in [place], he was diagnosed with sleep apnea, however he has since lost >100 pounds and continues to lose weight. Patient will be revaluated for a sleep study in the future.     He lives with a couple of roommates in [place], [place]. Patient recently moved here from [place], [place] for job opportunity. 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     Patient stated he has a ride home.     Information Source  Orientation : Oriented x 4  Information Given By: Patient  Informant's Name: Todd Goodrich  Who is responsible for making decisions for patient? : Patient    Readmission Evaluation  Is this a readmission?: No    Elopement Risk  Legal Hold: No  Ambulatory or Self Mobile in Wheelchair: Yes  Disoriented: No  Psychiatric Symptoms: None  History of Wandering: No  Elopement this Admit: No  Vocalizing Wanting to Leave: No  Displays Behaviors, Body Language Wanting to Leave: No-Not at Risk for Elopement  Elopement Risk: Not at Risk for Elopement    Interdisciplinary Discharge Planning  Does Admitting Nurse Feel This Could be a Complex Discharge?: No  Primary Care Physician: Not applicable  Lives with - Patient's Self Care Capacity: Unrelated Adult  Patient or legal guardian wants to designate a caregiver (see row info): No  Support Systems: Friends / Neighbors  Housing / Facility: 3 Story Apartment / Condo  Do You Take your Prescribed Medications  Regularly: No  Reasons Why Not Taking Medications : (No medications)  Able to Return to Previous ADL's: Yes  Mobility Issues: No  Prior Services: None  Patient Expects to be Discharged to:: Home  Assistance Needed: No  Durable Medical Equipment: Not Applicable    Discharge Preparedness  What is your plan after discharge?: Home with help  What are your discharge supports?: Other (comment)(patient has roommates )  Prior Functional Level: Ambulatory, Independent with Activities of Daily Living, Independent with Medication Management  Difficulity with ADLs: None  Difficulity with IADLs: None    Functional Assesment  Prior Functional Level: Ambulatory, Independent with Activities of Daily Living, Independent with Medication Management    Finances  Financial Barriers to Discharge: No  Prescription Coverage: Yes    Vision / Hearing Impairment  Vision Impairment : No  Hearing Impairment : Yes  Hearing Impairment: Both Ears(slight)  Does Pt Need Special Equipment for the Hearing Impaired?: No       Advance Directive  Advance Directive?: None  Advance Directive offered?: AD Booklet refused    Domestic Abuse  Have you ever been the victim of abuse or violence?: No  Physical Abuse or Sexual Abuse: No  Verbal Abuse or Emotional Abuse: No  Possible Abuse Reported to:: Not Applicable    Psychological Assessment  History of Substance Abuse: Marijuana  Date Last Used - Marijuana: (day of admission)  History of Psychiatric Problems: No  Non-compliant with Treatment: No  Newly Diagnosed Illness: Yes    Discharge Risks or Barriers  Discharge risks or barriers?: No  Patient risk factors: Bariatric    Anticipated Discharge Information  Anticipated discharge disposition: Home  Discharge Address: [facility] [address] [address]  Discharge Contact Phone Number: [phone]    No needs from case management on discharge    Carole Ravi RN,

## 2020-06-19 NOTE — DISCHARGE INSTRUCTIONS
Discharge Instructions per Abbie Nguyen M.D.  Continue healthy diet, exercising   follow up if interested at [facility] resident clinic [phone] for primary care   Follow up with GI, someone will call for apt     DIET: healthy, low fat     ACTIVITY: as tolerated     DIAGNOSIS: pancreatitis     Return to ER if worsening abdominal pain, nausea, vomiting, unusual bleeding, severe headache, severe chest pain, shortness of breath       Discharge Instructions    Discharged to home by car with self. Discharged via walking, hospital escort: Yes.  Special equipment needed: Not Applicable    Be sure to schedule a follow-up appointment with your primary care doctor or any specialists as instructed.     Discharge Plan:   Diet Plan: Discussed  Activity Level: Discussed  Confirmed Follow up Appointment: Patient to Call and Schedule Appointment  Confirmed Symptoms Management: Discussed  Medication Reconciliation Updated: Yes    I understand that a diet low in cholesterol, fat, and sodium is recommended for good health. Unless I have been given specific instructions below for another diet, I accept this instruction as my diet prescription.   Other diet: Healthy low fat    Special Instructions: None    · Is patient discharged on Warfarin / Coumadin?   No     Low-Fat Diet for Pancreatitis or Gallbladder Conditions  A low-fat diet can be helpful if you have pancreatitis or a gallbladder condition. With these conditions, your pancreas and gallbladder have trouble digesting fats. A healthy eating plan with less fat will help rest your pancreas and gallbladder and reduce your symptoms.  What do I need to know about this diet?  · Eat a low-fat diet.  ¨ Reduce your fat intake to less than 20-30% of your total daily calories. This is less than 50-60 g of fat per day.  ¨ Remember that you need some fat in your diet. Ask your dietician what your daily goal should be.  ¨ Choose nonfat and low-fat healthy foods. Look for the words “nonfat,”  “low fat,” or “fat free.”  ¨ As a guide, look on the label and choose foods with less than 3 g of fat per serving. Eat only one serving.  · Avoid alcohol.  · Do not smoke. If you need help quitting, talk with your health care provider.  · Eat small frequent meals instead of three large heavy meals.  What foods can I eat?  Grains   Include healthy grains and starches such as potatoes, wheat bread, fiber-rich cereal, and brown rice. Choose whole grain options whenever possible. In adults, whole grains should account for 45-65% of your daily calories.  Fruits and Vegetables   Eat plenty of fruits and vegetables. Fresh fruits and vegetables add fiber to your diet.  Meats and Other Protein Sources   Eat lean meat such as chicken and pork. Trim any fat off of meat before cooking it. Eggs, fish, and beans are other sources of protein. In adults, these foods should account for 10-35% of your daily calories.  Dairy   Choose low-fat milk and dairy options. Dairy includes fat and protein, as well as calcium.  Fats and Oils   Limit high-fat foods such as fried foods, sweets, baked goods, sugary drinks.  Other   Creamy sauces and condiments, such as mayonnaise, can add extra fat. Think about whether or not you need to use them, or use smaller amounts or low fat options.  What foods are not recommended?  · High fat foods, such as:  ¨ Baked goods.  ¨ Ice cream.  ¨ Congolese toast.  ¨ Sweet rolls.  ¨ Pizza.  ¨ Cheese bread.  ¨ Foods covered with batter, butter, creamy sauces, or cheese.  ¨ Fried foods.  ¨ Sugary drinks and desserts.  · Foods that cause gas or bloating  This information is not intended to replace advice given to you by your health care provider. Make sure you discuss any questions you have with your health care provider.  Document Released: 12/23/2014 Document Revised: 05/25/2017 Document Reviewed: 12/01/2014  Elsevier Interactive Patient Education © 2017 [employer]    Acute Pancreatitis  Introduction  Acute  pancreatitis is a condition in which the pancreas suddenly gets irritated and swollen (has inflammation). The pancreas is a large gland behind the stomach. It makes enzymes that help to digest food. The pancreas also makes hormones that help to control your blood sugar. Acute pancreatitis happens when the enzymes attack the pancreas and damage it. Most attacks last a couple of days and can cause serious problems.  Follow these instructions at home:  Eating and drinking  · Follow instructions from your doctor about diet. You may need to:  ¨ Avoid alcohol.  ¨ Limit how much fat is in your diet.  · Eat small meals often. Avoid eating big meals.  · Drink enough fluid to keep your pee (urine) clear or pale yellow.  · Do not drink alcohol if it caused your condition.  General instructions  · Take over-the-counter and prescription medicines only as told by your doctor.  · Do not use any tobacco products. These include cigarettes, chewing tobacco, and e-cigarettes. If you need help quitting, ask your doctor.  · Get plenty of rest.  · If directed, check your blood sugar at home as told by your doctor.  · Keep all follow-up visits as told by your doctor. This is important.  Contact a doctor if:  · You do not get better as quickly as expected.  · You have new symptoms.  · Your symptoms get worse.  · You have lasting pain or weakness.  · You continue to feel sick to your stomach (nauseous).  · You get better and then you have another pain attack.  · You have a fever.  Get help right away if:  · You cannot eat or keep fluids down.  · Your pain becomes very bad.  · Your skin or the white part of your eyes turns yellow (jaundice).  · You throw up (vomit).  · You feel dizzy or you pass out (faint).  · Your blood sugar is high (over 300 mg/dL).  This information is not intended to replace advice given to you by your health care provider. Make sure you discuss any questions you have with your health care provider.  Document Released:  06/05/2009 Document Revised: 05/25/2017 Document Reviewed: 09/20/2016  © 2017 [employer]      Depression / Suicide Risk    As you are discharged from [facility] facility, it is important to learn how to keep safe from harming yourself.    Recognize the warning signs:  · Abrupt changes in personality, positive or negative- including increase in energy   · Giving away possessions  · Change in eating patterns- significant weight changes-  positive or negative  · Change in sleeping patterns- unable to sleep or sleeping all the time   · Unwillingness or inability to communicate  · Depression  · Unusual sadness, discouragement and loneliness  · Talk of wanting to die  · Neglect of personal appearance   · Rebelliousness- reckless behavior  · Withdrawal from people/activities they love  · Confusion- inability to concentrate     If you or a loved one observes any of these behaviors or has concerns about self-harm, here's what you can do:  · Talk about it- your feelings and reasons for harming yourself  · Remove any means that you might use to hurt yourself (examples: pills, rope, extension cords, firearm)  · Get professional help from the community (Mental Health, Substance Abuse, psychological counseling)  · Do not be alone:Call your Safe Contact- someone whom you trust who will be there for you.  · Call your local CRISIS HOTLINE 490-6321 or 439-468-7642  · Call your local Children's Mobile Crisis Response Team [place] (387) 539-3497 or www.PassportParking  · Call the toll free National Suicide Prevention Hotlines   · National Suicide Prevention Lifeline 059-408-SRXL (6672)  · National Hope Line Network 800-SUICIDE (457-4197)

## 2020-06-19 NOTE — PROGRESS NOTES
Pt discharged home. IV's removed. Pt left unit via walking with CNA escort. Personal belongings with pt when leaving unit. Pt given discharge instructions prior to leaving unit including when to visit with physician; verbalizes understanding. Pt given 's note to return to work. Copy of discharge instructions with pt and in the chart.

## 2020-06-19 NOTE — DISCHARGE SUMMARY
Discharge Summary    CHIEF COMPLAINT ON ADMISSION  Chief Complaint   Patient presents with   • Abdominal Pain     since 10pm last night       Reason for Admission  Abd pain     Admission Date  6/17/2020    CODE STATUS  Full Code    HPI & HOSPITAL COURSE     24-year-old male past medical history of hypertension, obesity easily complaining of abdominal pain, bloating, abdominal pain at epigastrium area. he denied radiation to her back, severity 8 out of 10.  He denied nausea, vomiting or bowel movement changes.  He does not drink alcohol.  He denies drug use.  Lipase 2660 on admission.  First, ALT, AST doubled following day.lipid panel not explain pancreatitis. Mild leukocytosis.  MRCP negative for obstruction.  Pain did resolve 6/18.  hepatitis panel negative. Abdominal US Hepatomegaly and echogenic liver, compatible with fatty change versus fibrosis. LFTs did improve with IVF. Diet was advance. Etiology unclear of pancreatitis. I did refer pt to follow up with GI as OP. Healthy diet. Patient insisting to go home and he did request to follow up as OP .     Therefore, he is discharged in good and stable condition to home with close outpatient follow-up.    The patient met 2-midnight criteria for an inpatient stay at the time of discharge.    Discharge Date  06/19/2020    FOLLOW UP ITEMS POST DISCHARGE  None     DISCHARGE DIAGNOSES  Principal Problem (Resolved):    Acute pancreatitis POA: Yes  Active Problems:    Transaminitis POA: Yes    Essential hypertension POA: Yes    Obesity POA: Yes  Resolved Problems:    Leukocytosis POA: Yes      FOLLOW UP  No future appointments.  [address] [address] [phone]          MEDICATIONS ON DISCHARGE     Medication List      You have not been prescribed any medications.         Allergies  No Known Allergies    DIET  Orders Placed This Encounter   Procedures   • Diet Order Low Fiber(GI Soft)     Standing Status:   Standing      Number of Occurrences:   1     Order Specific Question:   Diet:     Answer:   Low Fiber(GI Soft) [2]       ACTIVITY  As tolerated.  Weight bearing as tolerated    CONSULTATIONS  None     PROCEDURES  None     LABORATORY  Lab Results   Component Value Date    SODIUM 131 (L) 06/19/2020    POTASSIUM 3.5 (L) 06/19/2020    CHLORIDE 99 06/19/2020    CO2 23 06/19/2020    GLUCOSE 78 06/19/2020    BUN 6 (L) 06/19/2020    CREATININE 0.69 06/19/2020        Lab Results   Component Value Date    WBC 7.9 06/19/2020    HEMOGLOBIN 15.7 06/19/2020    HEMATOCRIT 47.0 06/19/2020    PLATELETCT 166 06/19/2020        Total time of the discharge process exceeds 44 minutes.

## 2020-11-10 ENCOUNTER — APPOINTMENT (OUTPATIENT)
Dept: RADIOLOGY | Facility: MEDICAL CENTER | Age: 25
End: 2020-11-10
Attending: EMERGENCY MEDICINE
Payer: MEDICAID

## 2020-11-10 ENCOUNTER — HOSPITAL ENCOUNTER (EMERGENCY)
Facility: MEDICAL CENTER | Age: 25
End: 2020-11-10
Attending: EMERGENCY MEDICINE
Payer: MEDICAID

## 2020-11-10 VITALS
TEMPERATURE: 96.9 F | RESPIRATION RATE: 16 BRPM | BODY MASS INDEX: 43.95 KG/M2 | HEART RATE: 83 BPM | WEIGHT: 290 LBS | OXYGEN SATURATION: 93 % | DIASTOLIC BLOOD PRESSURE: 61 MMHG | HEIGHT: 68 IN | SYSTOLIC BLOOD PRESSURE: 123 MMHG

## 2020-11-10 DIAGNOSIS — Z71.89 ADVICE GIVEN ABOUT COVID-19 VIRUS INFECTION: Primary | ICD-10-CM

## 2020-11-10 DIAGNOSIS — B34.9 VIRAL ILLNESS: ICD-10-CM

## 2020-11-10 LAB
ANION GAP SERPL CALC-SCNC: 12 MMOL/L (ref 7–16)
BASOPHILS # BLD AUTO: 0 % (ref 0–1.8)
BASOPHILS # BLD: 0 K/UL (ref 0–0.12)
BUN SERPL-MCNC: 12 MG/DL (ref 8–22)
CALCIUM SERPL-MCNC: 8.7 MG/DL (ref 8.5–10.5)
CHLORIDE SERPL-SCNC: 103 MMOL/L (ref 96–112)
CO2 SERPL-SCNC: 26 MMOL/L (ref 20–33)
COVID ORDER STATUS COVID19: NORMAL
CREAT SERPL-MCNC: 1.05 MG/DL (ref 0.5–1.4)
EOSINOPHIL # BLD AUTO: 0.02 K/UL (ref 0–0.51)
EOSINOPHIL NFR BLD: 0.4 % (ref 0–6.9)
ERYTHROCYTE [DISTWIDTH] IN BLOOD BY AUTOMATED COUNT: 41.1 FL (ref 35.9–50)
GLUCOSE SERPL-MCNC: 95 MG/DL (ref 65–99)
HCT VFR BLD AUTO: 54.3 % (ref 42–52)
HGB BLD-MCNC: 17.9 G/DL (ref 14–18)
IMM GRANULOCYTES # BLD AUTO: 0.02 K/UL (ref 0–0.11)
IMM GRANULOCYTES NFR BLD AUTO: 0.4 % (ref 0–0.9)
LYMPHOCYTES # BLD AUTO: 0.7 K/UL (ref 1–4.8)
LYMPHOCYTES NFR BLD: 15.1 % (ref 22–41)
MCH RBC QN AUTO: 29.1 PG (ref 27–33)
MCHC RBC AUTO-ENTMCNC: 33 G/DL (ref 33.7–35.3)
MCV RBC AUTO: 88.3 FL (ref 81.4–97.8)
MONOCYTES # BLD AUTO: 0.39 K/UL (ref 0–0.85)
MONOCYTES NFR BLD AUTO: 8.4 % (ref 0–13.4)
NEUTROPHILS # BLD AUTO: 3.51 K/UL (ref 1.82–7.42)
NEUTROPHILS NFR BLD: 75.7 % (ref 44–72)
NRBC # BLD AUTO: 0 K/UL
NRBC BLD-RTO: 0 /100 WBC
PLATELET # BLD AUTO: 105 K/UL (ref 164–446)
PMV BLD AUTO: 13 FL (ref 9–12.9)
POTASSIUM SERPL-SCNC: 4 MMOL/L (ref 3.6–5.5)
RBC # BLD AUTO: 6.15 M/UL (ref 4.7–6.1)
SODIUM SERPL-SCNC: 141 MMOL/L (ref 135–145)
WBC # BLD AUTO: 4.6 K/UL (ref 4.8–10.8)

## 2020-11-10 PROCEDURE — U0003 INFECTIOUS AGENT DETECTION BY NUCLEIC ACID (DNA OR RNA); SEVERE ACUTE RESPIRATORY SYNDROME CORONAVIRUS 2 (SARS-COV-2) (CORONAVIRUS DISEASE [COVID-19]), AMPLIFIED PROBE TECHNIQUE, MAKING USE OF HIGH THROUGHPUT TECHNOLOGIES AS DESCRIBED BY CMS-2020-01-R: HCPCS

## 2020-11-10 PROCEDURE — 700102 HCHG RX REV CODE 250 W/ 637 OVERRIDE(OP): Performed by: EMERGENCY MEDICINE

## 2020-11-10 PROCEDURE — 99285 EMERGENCY DEPT VISIT HI MDM: CPT

## 2020-11-10 PROCEDURE — 36415 COLL VENOUS BLD VENIPUNCTURE: CPT

## 2020-11-10 PROCEDURE — 700111 HCHG RX REV CODE 636 W/ 250 OVERRIDE (IP): Performed by: EMERGENCY MEDICINE

## 2020-11-10 PROCEDURE — 80048 BASIC METABOLIC PNL TOTAL CA: CPT

## 2020-11-10 PROCEDURE — 85025 COMPLETE CBC W/AUTO DIFF WBC: CPT

## 2020-11-10 PROCEDURE — 700105 HCHG RX REV CODE 258: Performed by: EMERGENCY MEDICINE

## 2020-11-10 PROCEDURE — A9270 NON-COVERED ITEM OR SERVICE: HCPCS | Performed by: EMERGENCY MEDICINE

## 2020-11-10 PROCEDURE — 96374 THER/PROPH/DIAG INJ IV PUSH: CPT

## 2020-11-10 PROCEDURE — 71045 X-RAY EXAM CHEST 1 VIEW: CPT

## 2020-11-10 RX ORDER — BENZONATATE 100 MG/1
100 CAPSULE ORAL 3 TIMES DAILY PRN
Qty: 30 CAP | Refills: 0 | Status: SHIPPED | OUTPATIENT
Start: 2020-11-10

## 2020-11-10 RX ORDER — ONDANSETRON 2 MG/ML
4 INJECTION INTRAMUSCULAR; INTRAVENOUS ONCE
Status: COMPLETED | OUTPATIENT
Start: 2020-11-10 | End: 2020-11-10

## 2020-11-10 RX ORDER — ONDANSETRON 4 MG/1
4 TABLET, ORALLY DISINTEGRATING ORAL EVERY 6 HOURS PRN
Qty: 10 TAB | Refills: 0 | Status: SHIPPED | OUTPATIENT
Start: 2020-11-10

## 2020-11-10 RX ORDER — SODIUM CHLORIDE 9 MG/ML
1000 INJECTION, SOLUTION INTRAVENOUS ONCE
Status: COMPLETED | OUTPATIENT
Start: 2020-11-10 | End: 2020-11-10

## 2020-11-10 RX ORDER — IBUPROFEN 600 MG/1
600 TABLET ORAL ONCE
Status: COMPLETED | OUTPATIENT
Start: 2020-11-10 | End: 2020-11-10

## 2020-11-10 RX ORDER — BENZONATATE 100 MG/1
200 CAPSULE ORAL ONCE
Status: COMPLETED | OUTPATIENT
Start: 2020-11-10 | End: 2020-11-10

## 2020-11-10 RX ADMIN — SODIUM CHLORIDE 1000 ML: 9 INJECTION, SOLUTION INTRAVENOUS at 22:05

## 2020-11-10 RX ADMIN — BENZONATATE 200 MG: 100 CAPSULE ORAL at 22:16

## 2020-11-10 RX ADMIN — ONDANSETRON 4 MG: 2 INJECTION INTRAMUSCULAR; INTRAVENOUS at 22:16

## 2020-11-10 RX ADMIN — IBUPROFEN 600 MG: 600 TABLET, FILM COATED ORAL at 21:55

## 2020-11-10 ASSESSMENT — FIBROSIS 4 INDEX: FIB4 SCORE: 0.62

## 2020-11-10 NOTE — LETTER
11/11/2020               Todd Goodrich  [address]  [address]  [place] [id number]        Dear Todd (MR#[id number]),    This letter is to inform you that your COVID-19 test result is POSITIVE.  This means that the virus that causes COVID-19 was found in your sample.      SARS-CoV-2 Source   Date Value Ref Range Status   11/10/2020 NP Swab  Final     SARS-CoV-2 by PCR   Date Value Ref Range Status   11/10/2020 DETECTED (AA)  Final     Comment:     **The TaqPath COVID-19 SARS-CoV-2 test has been made available for use under  the Emergency Use Authorization (EUA) only.         The Health Department will be in contact with you soon.  You are encouraged to continue to quarantine and self-isolate according to the CDC guidance unless otherwise directed by the Health Department.  Per the CDC, you should continue to quarantine until at least 10 days have passed since your symptoms first appeared and at least 24 hours have passed since your fever resolved without the use of fever-reducing medications. Your other symptoms of COVID-19 should also be improving (loss of taste and smell may persist for weeks or months after recovery and should not delay the end of isolation).  You are advised to return to the ER for worsening symptoms including difficulty breathing, ongoing fever, weakness or chest pain.    Once any symptoms have resolved, it may be possible to donate plasma to help others that are currently ill with COVID-19. To learn more and apply, please contact the  at [phone] or via e-mail at [email or web address].    For any further questions regarding COVID-19, please contact the [facility] at [phone].  Thank you for your cooperation in the matter.      Sincerely,    [facility] Care Team

## 2020-11-11 LAB
SARS-COV-2 RNA RESP QL NAA+PROBE: DETECTED
SPECIMEN SOURCE: ABNORMAL

## 2020-11-11 NOTE — ED TRIAGE NOTES
Todd Goodrich  25 y.o.  Chief Complaint   Patient presents with   • Flu Like Symptoms     pt c/o cough and loss of smell x 1 week; also c/o nausea but no vomiting; pt also c/o body aches and fever (unsure of how high)   last took motrin last am

## 2020-11-11 NOTE — ED NOTES
COVID-19 Test Follow-Up  11/11/20    Patient is positive for COVID-19.      SARS-CoV-2 Source   Date Value Ref Range Status   11/10/2020 NP Swab  Final     SARS-CoV-2 by PCR   Date Value Ref Range Status   11/10/2020 DETECTED (AA)  Final     Comment:     **The TaqPath COVID-19 SARS-CoV-2 test has been made available for use under  the Emergency Use Authorization (EUA) only.       I attempted to contact the patient by phone, but there was no answer. Left a message to return call for results. I have informed the patient of the positive result by letter and that the Health Dept would be in contact soon. Instructed them to continue to quarantine and self-isolate according with the CDC guidance or as otherwise directed by the Health Dept.    Per the CDC, they should continue to self-isolate until:   • At least 10 days since symptoms first appeared and  • At least 24 hours with no fever without fever-reducing medication and  • Other symptoms of COVID-19 are improving (Loss of taste and smell may persist for weeks or months after recovery and need not delay the end of isolation)    They are advised to return to the ER for worsening symptoms including difficulty breathing, ongoing fever, weakness or chest pain.    Diana Baxter, PharmD

## 2020-11-11 NOTE — DISCHARGE INSTRUCTIONS
Your evaluation here was reassuring.  Your symptoms strongly suggest COVID-19 infection.  Please quarantine until you get the results tomorrow.  Please read the attached information about COVID-19 discharge instructions.  For aches and pains and fevers, use ibuprofen.  We have prescribed you medication for nausea and for cough at home.  Return immediately if you have severe shortness of breath.

## 2020-11-11 NOTE — ED PROVIDER NOTES
"ED Provider Note    Scribed for Jc Quintana M.D. by Jc Quintana M.D.. 11/10/2020,  9:53 PM.    CHIEF COMPLAINT  Chief Complaint   Patient presents with   • Flu Like Symptoms     pt c/o cough and loss of smell x 1 week; also c/o nausea but no vomiting; pt also c/o body aches and fever (unsure of how high)       HPI  Todd Goodrich is a 25 y.o. male who presents to the Emergency Department for flulike symptoms for the past week.  He reports a dry cough, fevers, body aches, and nausea.  He has not had vomiting or diarrhea.  He has not had chest pain or shortness of breath.  He denies any history of cardiac or pulmonary disease.  He has a fever of 100.4 on arrival, and presented with tachycardia to 118 bpm, though this has resolved at the bedside without intervention.    REVIEW OF SYSTEMS  See HPI for further details. All other systems are negative.     PAST MEDICAL HISTORY   has a past medical history of Hypertension.    SOCIAL HISTORY  Social History     Tobacco Use   • Smoking status: Never Smoker   • Smokeless tobacco: Never Used   Substance and Sexual Activity   • Alcohol use: Not Currently     Frequency: Monthly or less     Drinks per session: 1 or 2     Binge frequency: Never   • Drug use: Not Currently   • Sexual activity: Not on file     Social History     Substance and Sexual Activity   Drug Use Not Currently       SURGICAL HISTORY  patient denies any surgical history    CURRENT MEDICATIONS  Home Medications     Reviewed by Ashley Banda R.N. (Registered Nurse) on 11/10/20 at 2101  Med List Status: Not Addressed   Medication Last Dose Status        Patient Clayton Taking any Medications                       ALLERGIES  No Known Allergies    PHYSICAL EXAM  VITAL SIGNS: /61   Pulse 83   Temp 36.1 °C (96.9 °F) (Oral)   Resp 16   Ht 1.715 m (5' 7.5\")   Wt (!) 131.5 kg (290 lb)   SpO2 93%   BMI 44.75 kg/m²   Pulse ox interpretation: I interpret this pulse ox as normal.  Constitutional: " Alert in no apparent distress.  HENT: No signs of trauma, Bilateral external ears normal, Nose normal.   Eyes: Pupils are equal and reactive, Conjunctiva normal, Non-icteric.   Neck: Normal range of motion, Supple, No stridor.    Cardiovascular: Normal peripheral perfusion  Thorax & Lungs: Unlabored respirations, equal chest expansion, no accessory muscle use.   Abdomen: Non-distended  Skin:  No erythema, No rash.   Back: Normal alignment and ROM  Extremities: No gross deformity  Musculoskeletal: Good range of motion in all major joints.   Neurologic: Alert, Normal motor function, No focal deficits noted.   Psychiatric: Affect normal, Judgment normal, Mood normal.      DIAGNOSTIC STUDIES / PROCEDURES      LABS  Labs Reviewed   CBC WITH DIFFERENTIAL - Abnormal; Notable for the following components:       Result Value    WBC 4.6 (*)     RBC 6.15 (*)     Hematocrit 54.3 (*)     MCHC 33.0 (*)     Platelet Count 105 (*)     MPV 13.0 (*)     Neutrophils-Polys 75.70 (*)     Lymphocytes 15.10 (*)     Lymphs (Absolute) 0.70 (*)     All other components within normal limits   SARS-COV-2, PCR (IN-HOUSE) - Abnormal; Notable for the following components:    SARS-CoV-2 by PCR DETECTED (*)     All other components within normal limits    Narrative:     Is patient being admitted?->No  Expected turn around time?->Routine (In-House PCR up to 24  hours)  Is this the patients First SARS CoV-2 test?->No  Is this patient employed in healthcare?->Unknown  Is the patient symptomatic as defined by the CDC?->Yes  Date of symptom onset?->11/3/20  Is the patient hospitalized?->No  Is the patient a resident in a congregate care  setting?->Unknown  Is the patient pregnant?->No   BASIC METABOLIC PANEL   COVID/SARS COV-2    Narrative:     Is patient being admitted?->No  Expected turn around time?->Routine (In-House PCR up to 24  hours)  Is this the patients First SARS CoV-2 test?->No  Is this patient employed in healthcare?->Unknown  Is the  patient symptomatic as defined by the CDC?->Yes  Date of symptom onset?->11/3/20  Is the patient hospitalized?->No  Is the patient a resident in a congregate care  setting?->Unknown  Is the patient pregnant?->No   ESTIMATED GFR     All labs reviewed by me.    RADIOLOGY  DX-CHEST-LIMITED (1 VIEW)   Final Result      Hypoinflation with mild bibasilar opacities which may represent atelectasis and/or pneumonia. Covid 19 is possible.        The radiologist's interpretation of all radiological studies have been reviewed by me.    COURSE & MEDICAL DECISION MAKING  Nursing notes, VS, PMSFHx reviewed in chart.     9:53 PM Patient seen and examined at bedside.  We discussed that with his flulike symptoms, and especially the loss of taste and smell, he is extremely likely to have COVID-19.  He was tested at the health department this morning, but does not have the results yet.  He came to the emergency department because today he developed nausea, which increased his level of concern.  We discussed that we will check a CBC and BMP and chest x-ray to exclude bacterial infection, though this seems very unlikely.  We will also send a Covid swab here, to ensure that he has testing completed promptly.  With his presenting tachycardia and nausea, be treated with a liter of normal saline, Tessalon for his cough, Zofran for nausea, and Motrin for his fever.    11:00 PM this patient's labs are reassuring.  He is not hypoxic or tachycardic, and is generally well.  He understands that it is very likely he has COVID-19, and should quarantine until he gets his results.  We discussed return precautions for severe shortness of breath.    HYDRATION: Based on the patient's presentation of Tachycardia the patient was given IV fluids. IV Hydration was used because oral hydration was not as rapid as required. Upon recheck following hydration, the patient was Improved, with resolution of tachycardia.     The patient will return for new or  worsening symptoms and is stable at the time of discharge.    The patient is referred to a primary physician for blood pressure management, diabetic screening, and for all other preventative health concerns.    DISPOSITION:  Patient will be discharged home in stable condition.    FOLLOW UP:  No follow-up provider specified.    OUTPATIENT MEDICATIONS:  Discharge Medication List as of 11/10/2020 11:14 PM      START taking these medications    Details   ondansetron (ZOFRAN ODT) 4 MG TABLET DISPERSIBLE Take 1 Tab by mouth every 6 hours as needed for Nausea., Disp-10 Tab, R-0, Normal      benzonatate (TESSALON) 100 MG Cap Take 1 Cap by mouth 3 times a day as needed for Cough., Disp-30 Cap, R-0, Normal               FINAL IMPRESSION  1. Advice given about COVID-19 virus infection    2. Viral illness        ADDENDUM 11/11/20:  In reviewing this patient's chart, I have noted the positive COVID-19 test.

## 2020-11-11 NOTE — ED NOTES
ERP at bedside. IV started and labs collected and sent, pt medicated w/ Ibuprofen, benzonatate, zofran, and IV fluids. Covid swab obtained and sent. No other needs at this time.

## 2020-11-11 NOTE — ED NOTES
Pt discharged. Pt provided information about Covid. Pt educated to return to the hospital w/ any worsening symptoms. Pt had prescriptions e-scribed and walked to the lobby.

## 2020-11-11 NOTE — ED NOTES
Pt walked back to room. Pt tested for Covid today still pending results. Pt placed in gown and educated on ER process.

## 2020-12-18 ENCOUNTER — ANESTHESIA EVENT (OUTPATIENT)
Dept: SURGERY | Facility: MEDICAL CENTER | Age: 25
End: 2020-12-18
Payer: MEDICAID

## 2020-12-18 ENCOUNTER — APPOINTMENT (OUTPATIENT)
Dept: RADIOLOGY | Facility: MEDICAL CENTER | Age: 25
End: 2020-12-18
Attending: EMERGENCY MEDICINE
Payer: MEDICAID

## 2020-12-18 ENCOUNTER — ANESTHESIA (OUTPATIENT)
Dept: SURGERY | Facility: MEDICAL CENTER | Age: 25
End: 2020-12-18
Payer: MEDICAID

## 2020-12-18 ENCOUNTER — HOSPITAL ENCOUNTER (OUTPATIENT)
Facility: MEDICAL CENTER | Age: 25
End: 2020-12-19
Attending: EMERGENCY MEDICINE | Admitting: SURGERY
Payer: MEDICAID

## 2020-12-18 DIAGNOSIS — K80.00 CALCULUS OF GALLBLADDER WITH ACUTE CHOLECYSTITIS WITHOUT OBSTRUCTION: ICD-10-CM

## 2020-12-18 DIAGNOSIS — G89.18 POST-OP PAIN: ICD-10-CM

## 2020-12-18 LAB
ALBUMIN SERPL BCP-MCNC: 4.5 G/DL (ref 3.2–4.9)
ALBUMIN/GLOB SERPL: 1.4 G/DL
ALP SERPL-CCNC: 105 U/L (ref 30–99)
ALT SERPL-CCNC: 162 U/L (ref 2–50)
ANION GAP SERPL CALC-SCNC: 9 MMOL/L (ref 7–16)
AST SERPL-CCNC: 26 U/L (ref 12–45)
BASOPHILS # BLD AUTO: 0.3 % (ref 0–1.8)
BASOPHILS # BLD: 0.04 K/UL (ref 0–0.12)
BILIRUB SERPL-MCNC: 0.8 MG/DL (ref 0.1–1.5)
BUN SERPL-MCNC: 21 MG/DL (ref 8–22)
CALCIUM SERPL-MCNC: 9.3 MG/DL (ref 8.5–10.5)
CHLORIDE SERPL-SCNC: 100 MMOL/L (ref 96–112)
CO2 SERPL-SCNC: 28 MMOL/L (ref 20–33)
COVID ORDER STATUS COVID19: NORMAL
CREAT SERPL-MCNC: 0.86 MG/DL (ref 0.5–1.4)
EOSINOPHIL # BLD AUTO: 0.36 K/UL (ref 0–0.51)
EOSINOPHIL NFR BLD: 2.8 % (ref 0–6.9)
ERYTHROCYTE [DISTWIDTH] IN BLOOD BY AUTOMATED COUNT: 45.2 FL (ref 35.9–50)
FLUAV RNA SPEC QL NAA+PROBE: NEGATIVE
FLUBV RNA SPEC QL NAA+PROBE: NEGATIVE
GLOBULIN SER CALC-MCNC: 3.3 G/DL (ref 1.9–3.5)
GLUCOSE SERPL-MCNC: 131 MG/DL (ref 65–99)
HCT VFR BLD AUTO: 49.9 % (ref 42–52)
HGB BLD-MCNC: 16.7 G/DL (ref 14–18)
IMM GRANULOCYTES # BLD AUTO: 0.06 K/UL (ref 0–0.11)
IMM GRANULOCYTES NFR BLD AUTO: 0.5 % (ref 0–0.9)
LIPASE SERPL-CCNC: 63 U/L (ref 11–82)
LYMPHOCYTES # BLD AUTO: 2.12 K/UL (ref 1–4.8)
LYMPHOCYTES NFR BLD: 16.5 % (ref 22–41)
MCH RBC QN AUTO: 30.1 PG (ref 27–33)
MCHC RBC AUTO-ENTMCNC: 33.5 G/DL (ref 33.7–35.3)
MCV RBC AUTO: 89.9 FL (ref 81.4–97.8)
MONOCYTES # BLD AUTO: 0.81 K/UL (ref 0–0.85)
MONOCYTES NFR BLD AUTO: 6.3 % (ref 0–13.4)
NEUTROPHILS # BLD AUTO: 9.47 K/UL (ref 1.82–7.42)
NEUTROPHILS NFR BLD: 73.6 % (ref 44–72)
NRBC # BLD AUTO: 0 K/UL
NRBC BLD-RTO: 0 /100 WBC
PATHOLOGY CONSULT NOTE: NORMAL
PLATELET # BLD AUTO: 188 K/UL (ref 164–446)
PMV BLD AUTO: 12.4 FL (ref 9–12.9)
POTASSIUM SERPL-SCNC: 3.8 MMOL/L (ref 3.6–5.5)
PROT SERPL-MCNC: 7.8 G/DL (ref 6–8.2)
RBC # BLD AUTO: 5.55 M/UL (ref 4.7–6.1)
RSV RNA SPEC QL NAA+PROBE: NEGATIVE
SARS-COV-2 RNA RESP QL NAA+PROBE: NOTDETECTED
SODIUM SERPL-SCNC: 137 MMOL/L (ref 135–145)
SPECIMEN SOURCE: NORMAL
WBC # BLD AUTO: 12.9 K/UL (ref 4.8–10.8)

## 2020-12-18 PROCEDURE — 700111 HCHG RX REV CODE 636 W/ 250 OVERRIDE (IP): Performed by: EMERGENCY MEDICINE

## 2020-12-18 PROCEDURE — 85025 COMPLETE CBC W/AUTO DIFF WBC: CPT

## 2020-12-18 PROCEDURE — A9270 NON-COVERED ITEM OR SERVICE: HCPCS | Performed by: NURSE PRACTITIONER

## 2020-12-18 PROCEDURE — 90686 IIV4 VACC NO PRSV 0.5 ML IM: CPT | Performed by: SURGERY

## 2020-12-18 PROCEDURE — 80053 COMPREHEN METABOLIC PANEL: CPT

## 2020-12-18 PROCEDURE — 501577 HCHG TROCAR, STEP 11MM: Performed by: SURGERY

## 2020-12-18 PROCEDURE — 501399 HCHG SPECIMAN BAG, ENDO CATC: Performed by: SURGERY

## 2020-12-18 PROCEDURE — 502571 HCHG PACK, LAP CHOLE: Performed by: SURGERY

## 2020-12-18 PROCEDURE — 160009 HCHG ANES TIME/MIN: Performed by: SURGERY

## 2020-12-18 PROCEDURE — 160029 HCHG SURGERY MINUTES - 1ST 30 MINS LEVEL 4: Performed by: SURGERY

## 2020-12-18 PROCEDURE — 160048 HCHG OR STATISTICAL LEVEL 1-5: Performed by: SURGERY

## 2020-12-18 PROCEDURE — 700105 HCHG RX REV CODE 258: Performed by: EMERGENCY MEDICINE

## 2020-12-18 PROCEDURE — 500179 HCHG CATH, CHOLANGIOGRAM: Performed by: SURGERY

## 2020-12-18 PROCEDURE — C9803 HOPD COVID-19 SPEC COLLECT: HCPCS | Performed by: EMERGENCY MEDICINE

## 2020-12-18 PROCEDURE — 700111 HCHG RX REV CODE 636 W/ 250 OVERRIDE (IP): Performed by: ANESTHESIOLOGY

## 2020-12-18 PROCEDURE — 90471 IMMUNIZATION ADMIN: CPT

## 2020-12-18 PROCEDURE — 160002 HCHG RECOVERY MINUTES (STAT): Performed by: SURGERY

## 2020-12-18 PROCEDURE — 76705 ECHO EXAM OF ABDOMEN: CPT

## 2020-12-18 PROCEDURE — 700101 HCHG RX REV CODE 250: Performed by: ANESTHESIOLOGY

## 2020-12-18 PROCEDURE — G0378 HOSPITAL OBSERVATION PER HR: HCPCS

## 2020-12-18 PROCEDURE — 99291 CRITICAL CARE FIRST HOUR: CPT

## 2020-12-18 PROCEDURE — 96368 THER/DIAG CONCURRENT INF: CPT

## 2020-12-18 PROCEDURE — 96376 TX/PRO/DX INJ SAME DRUG ADON: CPT | Mod: XU

## 2020-12-18 PROCEDURE — 96366 THER/PROPH/DIAG IV INF ADDON: CPT

## 2020-12-18 PROCEDURE — 700111 HCHG RX REV CODE 636 W/ 250 OVERRIDE (IP): Performed by: NURSE PRACTITIONER

## 2020-12-18 PROCEDURE — 700101 HCHG RX REV CODE 250: Performed by: EMERGENCY MEDICINE

## 2020-12-18 PROCEDURE — 83690 ASSAY OF LIPASE: CPT

## 2020-12-18 PROCEDURE — 501838 HCHG SUTURE GENERAL: Performed by: SURGERY

## 2020-12-18 PROCEDURE — 0241U HCHG SARS-COV-2 COVID-19 NFCT DS RESP RNA 4 TRGT MIC: CPT

## 2020-12-18 PROCEDURE — 501583 HCHG TROCAR, THRD CAN&SEAL 5X100: Performed by: SURGERY

## 2020-12-18 PROCEDURE — 160036 HCHG PACU - EA ADDL 30 MINS PHASE I: Performed by: SURGERY

## 2020-12-18 PROCEDURE — 700105 HCHG RX REV CODE 258: Performed by: ANESTHESIOLOGY

## 2020-12-18 PROCEDURE — 700111 HCHG RX REV CODE 636 W/ 250 OVERRIDE (IP): Performed by: SURGERY

## 2020-12-18 PROCEDURE — 160035 HCHG PACU - 1ST 60 MINS PHASE I: Performed by: SURGERY

## 2020-12-18 PROCEDURE — 700101 HCHG RX REV CODE 250: Performed by: SURGERY

## 2020-12-18 PROCEDURE — 96375 TX/PRO/DX INJ NEW DRUG ADDON: CPT

## 2020-12-18 PROCEDURE — 500868 HCHG NEEDLE, SURGI(VARES): Performed by: SURGERY

## 2020-12-18 PROCEDURE — 96365 THER/PROPH/DIAG IV INF INIT: CPT

## 2020-12-18 PROCEDURE — 700102 HCHG RX REV CODE 250 W/ 637 OVERRIDE(OP): Performed by: NURSE PRACTITIONER

## 2020-12-18 PROCEDURE — 96375 TX/PRO/DX INJ NEW DRUG ADDON: CPT | Mod: XU

## 2020-12-18 PROCEDURE — A9270 NON-COVERED ITEM OR SERVICE: HCPCS | Performed by: ANESTHESIOLOGY

## 2020-12-18 PROCEDURE — 700102 HCHG RX REV CODE 250 W/ 637 OVERRIDE(OP): Performed by: ANESTHESIOLOGY

## 2020-12-18 PROCEDURE — 160041 HCHG SURGERY MINUTES - EA ADDL 1 MIN LEVEL 4: Performed by: SURGERY

## 2020-12-18 PROCEDURE — 700105 HCHG RX REV CODE 258: Performed by: NURSE PRACTITIONER

## 2020-12-18 PROCEDURE — 501570 HCHG TROCAR, SEPARATOR: Performed by: SURGERY

## 2020-12-18 PROCEDURE — 88304 TISSUE EXAM BY PATHOLOGIST: CPT

## 2020-12-18 RX ORDER — ONDANSETRON 2 MG/ML
4 INJECTION INTRAMUSCULAR; INTRAVENOUS EVERY 4 HOURS PRN
Status: DISCONTINUED | OUTPATIENT
Start: 2020-12-18 | End: 2020-12-19 | Stop reason: HOSPADM

## 2020-12-18 RX ORDER — MIDAZOLAM HYDROCHLORIDE 1 MG/ML
INJECTION INTRAMUSCULAR; INTRAVENOUS PRN
Status: DISCONTINUED | OUTPATIENT
Start: 2020-12-18 | End: 2020-12-18 | Stop reason: SURG

## 2020-12-18 RX ORDER — SODIUM CHLORIDE, SODIUM LACTATE, POTASSIUM CHLORIDE, CALCIUM CHLORIDE 600; 310; 30; 20 MG/100ML; MG/100ML; MG/100ML; MG/100ML
INJECTION, SOLUTION INTRAVENOUS
Status: DISCONTINUED | OUTPATIENT
Start: 2020-12-18 | End: 2020-12-18 | Stop reason: SURG

## 2020-12-18 RX ORDER — MEPERIDINE HYDROCHLORIDE 25 MG/ML
12.5 INJECTION INTRAMUSCULAR; INTRAVENOUS; SUBCUTANEOUS
Status: DISCONTINUED | OUTPATIENT
Start: 2020-12-18 | End: 2020-12-18 | Stop reason: HOSPADM

## 2020-12-18 RX ORDER — SODIUM CHLORIDE, SODIUM LACTATE, POTASSIUM CHLORIDE, CALCIUM CHLORIDE 600; 310; 30; 20 MG/100ML; MG/100ML; MG/100ML; MG/100ML
1000 INJECTION, SOLUTION INTRAVENOUS CONTINUOUS
Status: DISCONTINUED | OUTPATIENT
Start: 2020-12-18 | End: 2020-12-18 | Stop reason: HOSPADM

## 2020-12-18 RX ORDER — DIPHENHYDRAMINE HYDROCHLORIDE 50 MG/ML
12.5 INJECTION INTRAMUSCULAR; INTRAVENOUS
Status: DISCONTINUED | OUTPATIENT
Start: 2020-12-18 | End: 2020-12-18 | Stop reason: HOSPADM

## 2020-12-18 RX ORDER — HYDROMORPHONE HYDROCHLORIDE 1 MG/ML
0.1 INJECTION, SOLUTION INTRAMUSCULAR; INTRAVENOUS; SUBCUTANEOUS
Status: DISCONTINUED | OUTPATIENT
Start: 2020-12-18 | End: 2020-12-18 | Stop reason: HOSPADM

## 2020-12-18 RX ORDER — DEXAMETHASONE SODIUM PHOSPHATE 4 MG/ML
4 INJECTION, SOLUTION INTRA-ARTICULAR; INTRALESIONAL; INTRAMUSCULAR; INTRAVENOUS; SOFT TISSUE
Status: DISCONTINUED | OUTPATIENT
Start: 2020-12-18 | End: 2020-12-19 | Stop reason: HOSPADM

## 2020-12-18 RX ORDER — HYDROMORPHONE HYDROCHLORIDE 1 MG/ML
0.2 INJECTION, SOLUTION INTRAMUSCULAR; INTRAVENOUS; SUBCUTANEOUS
Status: DISCONTINUED | OUTPATIENT
Start: 2020-12-18 | End: 2020-12-18 | Stop reason: HOSPADM

## 2020-12-18 RX ORDER — IBUPROFEN 200 MG
400 TABLET ORAL EVERY 6 HOURS PRN
COMMUNITY

## 2020-12-18 RX ORDER — BUPIVACAINE HYDROCHLORIDE AND EPINEPHRINE 5; 5 MG/ML; UG/ML
INJECTION, SOLUTION EPIDURAL; INTRACAUDAL; PERINEURAL
Status: DISCONTINUED | OUTPATIENT
Start: 2020-12-18 | End: 2020-12-18 | Stop reason: HOSPADM

## 2020-12-18 RX ORDER — HALOPERIDOL 5 MG/ML
1 INJECTION INTRAMUSCULAR EVERY 6 HOURS PRN
Status: DISCONTINUED | OUTPATIENT
Start: 2020-12-18 | End: 2020-12-19 | Stop reason: HOSPADM

## 2020-12-18 RX ORDER — ONDANSETRON 2 MG/ML
4 INJECTION INTRAMUSCULAR; INTRAVENOUS
Status: COMPLETED | OUTPATIENT
Start: 2020-12-18 | End: 2020-12-18

## 2020-12-18 RX ORDER — MORPHINE SULFATE 4 MG/ML
4 INJECTION, SOLUTION INTRAMUSCULAR; INTRAVENOUS ONCE
Status: COMPLETED | OUTPATIENT
Start: 2020-12-18 | End: 2020-12-18

## 2020-12-18 RX ORDER — LABETALOL HYDROCHLORIDE 5 MG/ML
5 INJECTION, SOLUTION INTRAVENOUS
Status: DISCONTINUED | OUTPATIENT
Start: 2020-12-18 | End: 2020-12-18 | Stop reason: HOSPADM

## 2020-12-18 RX ORDER — OXYCODONE HYDROCHLORIDE 5 MG/1
5 TABLET ORAL
Status: DISCONTINUED | OUTPATIENT
Start: 2020-12-18 | End: 2020-12-19 | Stop reason: HOSPADM

## 2020-12-18 RX ORDER — HYDROMORPHONE HYDROCHLORIDE 1 MG/ML
0.4 INJECTION, SOLUTION INTRAMUSCULAR; INTRAVENOUS; SUBCUTANEOUS
Status: DISCONTINUED | OUTPATIENT
Start: 2020-12-18 | End: 2020-12-18 | Stop reason: HOSPADM

## 2020-12-18 RX ORDER — DIPHENHYDRAMINE HYDROCHLORIDE 50 MG/ML
25 INJECTION INTRAMUSCULAR; INTRAVENOUS EVERY 6 HOURS PRN
Status: DISCONTINUED | OUTPATIENT
Start: 2020-12-18 | End: 2020-12-19 | Stop reason: HOSPADM

## 2020-12-18 RX ORDER — LIDOCAINE HYDROCHLORIDE 20 MG/ML
INJECTION, SOLUTION EPIDURAL; INFILTRATION; INTRACAUDAL; PERINEURAL PRN
Status: DISCONTINUED | OUTPATIENT
Start: 2020-12-18 | End: 2020-12-18 | Stop reason: SURG

## 2020-12-18 RX ORDER — DEXAMETHASONE SODIUM PHOSPHATE 4 MG/ML
INJECTION, SOLUTION INTRA-ARTICULAR; INTRALESIONAL; INTRAMUSCULAR; INTRAVENOUS; SOFT TISSUE PRN
Status: DISCONTINUED | OUTPATIENT
Start: 2020-12-18 | End: 2020-12-18 | Stop reason: SURG

## 2020-12-18 RX ORDER — ONDANSETRON 2 MG/ML
4 INJECTION INTRAMUSCULAR; INTRAVENOUS ONCE
Status: COMPLETED | OUTPATIENT
Start: 2020-12-18 | End: 2020-12-18

## 2020-12-18 RX ORDER — SCOLOPAMINE TRANSDERMAL SYSTEM 1 MG/1
1 PATCH, EXTENDED RELEASE TRANSDERMAL
Status: DISCONTINUED | OUTPATIENT
Start: 2020-12-18 | End: 2020-12-19 | Stop reason: HOSPADM

## 2020-12-18 RX ORDER — CEFOTETAN DISODIUM 2 G/20ML
INJECTION, POWDER, FOR SOLUTION INTRAMUSCULAR; INTRAVENOUS PRN
Status: DISCONTINUED | OUTPATIENT
Start: 2020-12-18 | End: 2020-12-18 | Stop reason: SURG

## 2020-12-18 RX ORDER — ONDANSETRON 2 MG/ML
INJECTION INTRAMUSCULAR; INTRAVENOUS PRN
Status: DISCONTINUED | OUTPATIENT
Start: 2020-12-18 | End: 2020-12-18 | Stop reason: SURG

## 2020-12-18 RX ORDER — HYDROCODONE BITARTRATE AND ACETAMINOPHEN 5; 325 MG/1; MG/1
1-2 TABLET ORAL EVERY 4 HOURS PRN
Qty: 10 TAB | Refills: 0 | Status: SHIPPED | OUTPATIENT
Start: 2020-12-18 | End: 2020-12-20

## 2020-12-18 RX ORDER — ROCURONIUM BROMIDE 10 MG/ML
INJECTION, SOLUTION INTRAVENOUS PRN
Status: DISCONTINUED | OUTPATIENT
Start: 2020-12-18 | End: 2020-12-18 | Stop reason: SURG

## 2020-12-18 RX ORDER — SODIUM CHLORIDE 9 MG/ML
INJECTION, SOLUTION INTRAVENOUS CONTINUOUS
Status: DISCONTINUED | OUTPATIENT
Start: 2020-12-18 | End: 2020-12-19 | Stop reason: HOSPADM

## 2020-12-18 RX ADMIN — SUGAMMADEX 260 MG: 100 INJECTION, SOLUTION INTRAVENOUS at 14:16

## 2020-12-18 RX ADMIN — DEXAMETHASONE SODIUM PHOSPHATE 8 MG: 4 INJECTION, SOLUTION INTRA-ARTICULAR; INTRALESIONAL; INTRAMUSCULAR; INTRAVENOUS; SOFT TISSUE at 13:46

## 2020-12-18 RX ADMIN — OXYCODONE HYDROCHLORIDE 5 MG: 5 TABLET ORAL at 18:22

## 2020-12-18 RX ADMIN — ONDANSETRON 4 MG: 2 INJECTION INTRAMUSCULAR; INTRAVENOUS at 07:00

## 2020-12-18 RX ADMIN — LIDOCAINE HYDROCHLORIDE 100 MG: 20 INJECTION, SOLUTION EPIDURAL; INFILTRATION; INTRACAUDAL at 13:35

## 2020-12-18 RX ADMIN — METRONIDAZOLE 500 MG: 500 INJECTION, SOLUTION INTRAVENOUS at 08:22

## 2020-12-18 RX ADMIN — ONDANSETRON 4 MG: 2 INJECTION INTRAMUSCULAR; INTRAVENOUS at 15:24

## 2020-12-18 RX ADMIN — FENTANYL CITRATE 50 MCG: 50 INJECTION, SOLUTION INTRAMUSCULAR; INTRAVENOUS at 15:19

## 2020-12-18 RX ADMIN — SODIUM CHLORIDE, POTASSIUM CHLORIDE, SODIUM LACTATE AND CALCIUM CHLORIDE: 600; 310; 30; 20 INJECTION, SOLUTION INTRAVENOUS at 13:31

## 2020-12-18 RX ADMIN — ONDANSETRON 4 MG: 2 INJECTION INTRAMUSCULAR; INTRAVENOUS at 13:46

## 2020-12-18 RX ADMIN — CEFOTETAN DISODIUM 2 G: 2 INJECTION, POWDER, FOR SOLUTION INTRAMUSCULAR; INTRAVENOUS at 13:35

## 2020-12-18 RX ADMIN — ROCURONIUM BROMIDE 50 MG: 10 INJECTION, SOLUTION INTRAVENOUS at 13:35

## 2020-12-18 RX ADMIN — PROPOFOL 200 MG: 10 INJECTION, EMULSION INTRAVENOUS at 13:35

## 2020-12-18 RX ADMIN — INFLUENZA A VIRUS A/GUANGDONG-MAONAN/SWL1536/2019 CNIC-1909 (H1N1) ANTIGEN (FORMALDEHYDE INACTIVATED), INFLUENZA A VIRUS A/HONG KONG/2671/2019 (H3N2) ANTIGEN (FORMALDEHYDE INACTIVATED), INFLUENZA B VIRUS B/PHUKET/3073/2013 ANTIGEN (FORMALDEHYDE INACTIVATED), AND INFLUENZA B VIRUS B/WASHINGTON/02/2019 ANTIGEN (FORMALDEHYDE INACTIVATED) 0.5 ML: 15; 15; 15; 15 INJECTION, SUSPENSION INTRAMUSCULAR at 20:25

## 2020-12-18 RX ADMIN — MORPHINE SULFATE 4 MG: 4 INJECTION INTRAVENOUS at 07:00

## 2020-12-18 RX ADMIN — ONDANSETRON 4 MG: 2 INJECTION INTRAMUSCULAR; INTRAVENOUS at 18:22

## 2020-12-18 RX ADMIN — MIDAZOLAM HYDROCHLORIDE 2 MG: 1 INJECTION, SOLUTION INTRAMUSCULAR; INTRAVENOUS at 13:31

## 2020-12-18 RX ADMIN — SODIUM CHLORIDE: 9 INJECTION, SOLUTION INTRAVENOUS at 18:17

## 2020-12-18 RX ADMIN — CEFTRIAXONE SODIUM 2 G: 2 INJECTION, POWDER, FOR SOLUTION INTRAMUSCULAR; INTRAVENOUS at 08:22

## 2020-12-18 RX ADMIN — HYDROCODONE BITARTRATE AND ACETAMINOPHEN 15 MG: 7.5; 325 SOLUTION ORAL at 15:15

## 2020-12-18 RX ADMIN — FENTANYL CITRATE 50 MCG: 50 INJECTION, SOLUTION INTRAMUSCULAR; INTRAVENOUS at 15:40

## 2020-12-18 RX ADMIN — FENTANYL CITRATE 100 MCG: 50 INJECTION, SOLUTION INTRAMUSCULAR; INTRAVENOUS at 13:35

## 2020-12-18 ASSESSMENT — COGNITIVE AND FUNCTIONAL STATUS - GENERAL
CLIMB 3 TO 5 STEPS WITH RAILING: A LITTLE
SUGGESTED CMS G CODE MODIFIER DAILY ACTIVITY: CH
MOBILITY SCORE: 23
SUGGESTED CMS G CODE MODIFIER MOBILITY: CI
DAILY ACTIVITIY SCORE: 24

## 2020-12-18 ASSESSMENT — PAIN DESCRIPTION - PAIN TYPE
TYPE: SURGICAL PAIN

## 2020-12-18 ASSESSMENT — PATIENT HEALTH QUESTIONNAIRE - PHQ9
2. FEELING DOWN, DEPRESSED, IRRITABLE, OR HOPELESS: NOT AT ALL
SUM OF ALL RESPONSES TO PHQ9 QUESTIONS 1 AND 2: 0
1. LITTLE INTEREST OR PLEASURE IN DOING THINGS: NOT AT ALL

## 2020-12-18 ASSESSMENT — LIFESTYLE VARIABLES
TOTAL SCORE: 0
HAVE PEOPLE ANNOYED YOU BY CRITICIZING YOUR DRINKING: NO
HAVE YOU EVER FELT YOU SHOULD CUT DOWN ON YOUR DRINKING: NO
TOTAL SCORE: 0
CONSUMPTION TOTAL: NEGATIVE
HOW MANY TIMES IN THE PAST YEAR HAVE YOU HAD 5 OR MORE DRINKS IN A DAY: 0
DOES PATIENT WANT TO STOP DRINKING: NO
ALCOHOL_USE: YES
TOTAL SCORE: 0
EVER HAD A DRINK FIRST THING IN THE MORNING TO STEADY YOUR NERVES TO GET RID OF A HANGOVER: NO
AVERAGE NUMBER OF DAYS PER WEEK YOU HAVE A DRINK CONTAINING ALCOHOL: 1
ON A TYPICAL DAY WHEN YOU DRINK ALCOHOL HOW MANY DRINKS DO YOU HAVE: 2
EVER FELT BAD OR GUILTY ABOUT YOUR DRINKING: NO

## 2020-12-18 ASSESSMENT — FIBROSIS 4 INDEX: FIB4 SCORE: 0.98

## 2020-12-18 NOTE — ED NOTES
Pharmacy Medication Reconciliation      Medication reconciliation updated and complete per pt at bedside  Allergies have been verified  No oral ABX within the last 14 days  Patient home pharmacy:[facility]

## 2020-12-18 NOTE — OP REPORT
DATE OF OPERATION: 12/18/2020    PREOPERATIVE DIAGNOSIS: right upper quadrant pain and cholelithiasis.    POSTOPERATIVE DIAGNOSIS: right upper quadrant pain and cholelithiasis.    PROCEDURE PERFORMED: laparoscopic cholecystectomy, attempted cholangiogram (unsuccessful due to cystic duct occlusion)     SURGEON: Maik Torres M.D.    ASSISTANT: EMILY Duff.    ANESTHESIOLOGIST: Rika Bailey M.D.    ANESTHESIA: General endotracheal anesthesia.    ASA CLASSIFICATION: I.    INDICATIONS: 45-year-old gentleman who was admitted for right upper quadrant abdominal pain.  Ultrasound demonstrated gallstones and dilated common bile duct.  The patient's total bilirubin was normal.  Plan is for laparoscopic cholecystectomy and intraoperative cholangiogram    FINDINGS: Moderate chronic inflammation of the gallbladder, attempted to perform a cholangiogram however was able unable to advance the cholangiogram catheter into the cystic duct suggesting that the cystic duct was occluded.  Therefore the cholangiogram was not performed    WOUND CLASSIFICATION: Class II, Clean Contaminated.    SPECIMEN: Gallbladder.    ESTIMATED BLOOD LOSS: 5 mL.    PROCEDURE: Following informed consent consent, the patient was properly identified, taken to the operating room and placed in supine position where general endotracheal anesthesia was administered. Intravenous antibiotics were administered by the anesthesiologist in correct time interval. The patient voided prior to surgery. A urinary catheter was not placed. Sequential compression devices were employed. The abdomen was prepped and draped into a sterile field.     Marcaine 0.5% was used to infiltrate the port sites. A 5 mm infraumbilical midline incision was made and the subcutaneous tissues spread bluntly. The fascia was elevated with a hook and a Veress needle was atraumatically inserted. Carbon dioxide pneumoperitoneum was instilled. A 5 mm separator port was passed. A  5 mm 30 degree lens and camera was passed into the peritoneal cavity. An 11 mm port was placed in the epigastric midline under direct vision. Two 5 mm right subcostal ports were placed under direct vision.   Gallbladder was identified and retracted cephalad.  The infundibular gallbladder was identified.  The cystic duct was clearly identified skeletonized and a clip was placed distally on the cystic duct.  A ductotomy incision was made and a 2nd trocar was placed in order to place the cholangiogram catheter into the peritoneal cavity.  Attempted to advance the cholangiogram catheter into the cystic duct however the catheter would not advance.  This was either due to the spiral valves or possibly occlusion of the cystic duct.  I felt as though any further attempts would risk injuring the duct and result in a postoperative bile leak therefore the cholangiogram catheter was removed and a cholangiogram was not performed.  The cystic duct was then clipped twice and transected.  The cystic artery was identified and also ligated between clips.  Gallbladder was then dissected from the gallbladder bed using electrocautery.  The gallbladder was placed in an Endo Catch bag and was removed from the peritoneal cavity through the subxiphoid port.  The area was irrigated hemostasis was assured all ports were removed under direct vision.  The subxiphoid fascial defect was reapproximated using a 2-0 Vicryl suture.  A 4-0 strata fix suture was then used to reapproximate the skin incision.  Benzoin Steri-Strips sterile dressings were applied.    The patient tolerated the procedure well, and there were no apparent complications. All sponge, needle, and instrument counts were correct on 2 separate occasions. The patient was awakened, extubated, and transferred to  to the post anesthesia care unit (PACU) in satisfactory condition.       ____________________________________     Maik Torres M.D.    DD: 12/18/2020  2:22 PM

## 2020-12-18 NOTE — ANESTHESIA PROCEDURE NOTES
Airway    Date/Time: 12/18/2020 1:37 PM  Performed by: Rika Bailey M.D.  Authorized by: Rika Bailey M.D.     Location:  OR  Urgency:  Elective  Difficult Airway: No    Indications for Airway Management:  Anesthesia      Spontaneous Ventilation: absent    Sedation Level:  Deep  Preoxygenated: Yes    Patient Position:  Sniffing  MILS Maintained Throughout: No    Mask Difficulty Assessment:  2 - vent by mask + OA or adjuvant +/- NMBA  Final Airway Type:  Endotracheal airway  Final Endotracheal Airway:  ETT  Cuffed: Yes    Technique Used for Successful ETT Placement:  Video laryngoscopy    Insertion Site:  Oral  Blade Type:  Ramiro  Laryngoscope Blade/Videolaryngoscope Blade Size:  4  ETT Size (mm):  7.5  Measured from:  Lips  ETT to Lips (cm):  23  Placement Verified by: auscultation and capnometry    Cormack-Lehane Classification:  Grade I - full view of glottis  Number of Attempts at Approach:  1  Number of Other Approaches Attempted:  0

## 2020-12-18 NOTE — H&P
Date of Service:  12-18-20    PCP: No primary care provider on file.    CC: Abdominal pain    HPI: This is a 25 y.o. male who presents to the emergency department for evaluation of right upper quadrant abdominal pain.  The patient reports that the pain began approximately 2 days ago.  He has had no diarrhea vomiting.  The patient does report that the pain in his right upper quadrant is exacerbated by movement.  The patient of note did test positive for Covid in November.  Work-up in the emergency room demonstrates gallstones.  There is also the suggestion of possible common duct dilatation although the total bilirubin is normal.    ROS: As above. The remainder of a complete review of systems is negative in all systems except as noted.    PMHx:  Active Ambulatory Problems     Diagnosis Date Noted   • Essential hypertension 06/17/2020   • Obesity 06/17/2020   • Transaminitis 06/17/2020     Resolved Ambulatory Problems     Diagnosis Date Noted   • Acute pancreatitis 06/17/2020   • Leukocytosis 06/17/2020     Past Medical History:   Diagnosis Date   • Hypertension        SHx:  Social History     Socioeconomic History   • Marital status: Single     Spouse name: Not on file   • Number of children: Not on file   • Years of education: Not on file   • Highest education level: Not on file   Occupational History   • Not on file   Social Needs   • Financial resource strain: Not on file   • Food insecurity     Worry: Never true     Inability: Never true   • Transportation needs     Medical: No     Non-medical: No   Tobacco Use   • Smoking status: Never Smoker   • Smokeless tobacco: Never Used   Substance and Sexual Activity   • Alcohol use: Not Currently     Frequency: Monthly or less     Drinks per session: 1 or 2     Binge frequency: Never   • Drug use: Not Currently   • Sexual activity: Not on file   Lifestyle   • Physical activity     Days per week: Not on file     Minutes per session: Not on file   • Stress: Not on file  "  Relationships   • Social connections     Talks on phone: Not on file     Gets together: Not on file     Attends Confucianism service: Not on file     Active member of club or organization: Not on file     Attends meetings of clubs or organizations: Not on file     Relationship status: Not on file   • Intimate partner violence     Fear of current or ex partner: Not on file     Emotionally abused: Not on file     Physically abused: Not on file     Forced sexual activity: Not on file   Other Topics Concern   • Not on file   Social History Narrative   • Not on file       FHx:  family history includes Hypertension in his father and mother; Kidney Disease in his mother; Stroke in his mother.    Allergies:  No Known Allergies    Medications:  No current facility-administered medications on file prior to encounter.      Current Outpatient Medications on File Prior to Encounter   Medication Sig Dispense Refill   • ibuprofen (MOTRIN) 200 MG Tab Take 400 mg by mouth every 6 hours as needed for Mild Pain or Headache.     • ondansetron (ZOFRAN ODT) 4 MG TABLET DISPERSIBLE Take 1 Tab by mouth every 6 hours as needed for Nausea. (Patient not taking: Reported on 12/18/2020) 10 Tab 0   • benzonatate (TESSALON) 100 MG Cap Take 1 Cap by mouth 3 times a day as needed for Cough. (Patient not taking: Reported on 12/18/2020) 30 Cap 0       Objective Exam:  Vitals:    12/18/20 0605 12/18/20 0606 12/18/20 0622 12/18/20 0701   BP: 145/94  144/88 135/85   Pulse: 65  60 64   Resp: 18  18    Temp: 35.9 °C (96.6 °F)      TempSrc: Temporal      SpO2: 99%  100% 96%   Weight:  (!) 131 kg (288 lb 12.8 oz)     Height: 1.702 m (5' 7\") 1.702 m (5' 7\")         Review of Systems  RUQ pain       General: Awake and Alert  HEENT: normocephalic, atraumatic  Chest: Clear and equal bilaterally  CV: RRR  Abd: Soft, RUQ tenderness, nondistended  Ext: Warm, well perfused   Vasc: intact   Neuro: Intact  Skin: normal    Laboratory--reviewed personally and are as " follows:  Lab Results   Component Value Date/Time    WBC 12.9 (H) 12/18/2020 06:21 AM    RBC 5.55 12/18/2020 06:21 AM    HEMOGLOBIN 16.7 12/18/2020 06:21 AM    HEMATOCRIT 49.9 12/18/2020 06:21 AM    MCV 89.9 12/18/2020 06:21 AM    MCH 30.1 12/18/2020 06:21 AM    MCHC 33.5 (L) 12/18/2020 06:21 AM    MPV 12.4 12/18/2020 06:21 AM    NEUTSPOLYS 73.60 (H) 12/18/2020 06:21 AM    LYMPHOCYTES 16.50 (L) 12/18/2020 06:21 AM    MONOCYTES 6.30 12/18/2020 06:21 AM    EOSINOPHILS 2.80 12/18/2020 06:21 AM    BASOPHILS 0.30 12/18/2020 06:21 AM      Lab Results   Component Value Date/Time    SODIUM 137 12/18/2020 06:21 AM    POTASSIUM 3.8 12/18/2020 06:21 AM    CHLORIDE 100 12/18/2020 06:21 AM    CO2 28 12/18/2020 06:21 AM    GLUCOSE 131 (H) 12/18/2020 06:21 AM    BUN 21 12/18/2020 06:21 AM    CREATININE 0.86 12/18/2020 06:21 AM      No results found for: PROTHROMBTM, INR     Radiology  1.  Common bile duct dilatation, concerning for distal obstructing stone, stenosis, or ampullary lesion. Further evaluation with ERCP or MRCP to exclude obstructing process recommended.  2.  Hepatomegaly  3.  Cholelithiasis without additional sonographic findings of acute cholecystitis    MDM:    Suspected acute cholecystitis  Possible choledocholithiasis    Plan-     Recommend laparoscopic cholecystectomy with intraoperative cholangiogram.  I discussed the risk benefits alternatives and expectations with the patient he agrees to proceed.

## 2020-12-18 NOTE — ED TRIAGE NOTES
"Chief Complaint   Patient presents with   • Abdominal Pain     Pt walked in with a steady gait c/o increase in chronic abd pain.  Pt was admitted in June for \"high liver enzymes\", pt has felt progressively worse since that time    Pt & staff masked and in appropriate PPE during encounter.  Pt denies fever/travel or being in contact with anyone testing positive for Covid.  Explained pt the triage process, made pt aware to tell the RN/staff of any changes/concerns, pt verbalized understanding of process and instructions given.  Pt to ER lobby.    "

## 2020-12-18 NOTE — ED NOTES
Report to Pre-Op patient as of now case will follow the 13:00 case. Will update patient on plan of care

## 2020-12-18 NOTE — ANESTHESIA PREPROCEDURE EVALUATION
Relevant Problems   ANESTHESIA (within normal limits)      CARDIAC   (+) Essential hypertension      Other   (+) Obesity   (+) Transaminitis       Physical Exam    Airway   Mallampati: I  TM distance: >3 FB  Neck ROM: full       Cardiovascular - normal exam  Rhythm: regular  Rate: normal  (-) murmur     Dental - normal exam           Pulmonary - normal exam  Breath sounds clear to auscultation     Abdominal    Neurological - normal exam               Anesthesia Plan    ASA 3   ASA physical status 3 criteria: morbid obesity - BMI greater than or equal to 40    Plan - general       Airway plan will be ETT      Plan Factors:   Patient was not previously instructed to abstain from smoking on day of procedure.  Patient did not smoke on day of procedure.      Induction: intravenous    Postoperative Plan: Postoperative administration of opioids is intended.    Pertinent diagnostic labs and testing reviewed    Informed Consent:    Anesthetic plan and risks discussed with patient.    Use of blood products discussed with: patient whom consented to blood products.

## 2020-12-18 NOTE — ED PROVIDER NOTES
ED Provider Note    Scribed for Jackie Mendez M.D. by Shawna Collazo. 12/18/2020  6:26 AM    Primary care provider: None noted  Means of arrival: Walk-In  History obtained from: Patient  History limited by: None    CHIEF COMPLAINT  Chief Complaint   Patient presents with   • Abdominal Pain       HPI  Todd Goodrich is a 25 y.o. male who presents to the Emergency Department for evaluation of right upper abdominal pain onset two days ago. Patient states his pain is a 6/10. Patient has associated nausea. Denies any diarrhea, vomiting, cough, or fever. Pain is exacerbated with movement and with breathing. No alleviating factors reported. Patient adds that he tested positive for COVID on November 11th. He notes that he saw GI before he had COVID, who suggested he take milk of magnesium to help alleviate his symptoms. Patient notes that there was no improvement. Patient denies any drug, alcohol, or cigarette use.     REVIEW OF SYSTEMS  HEENT:  No ear pain, congestion, or sore throat   EYES: no discharge, redness, or vision changes  CARDIAC: no chest pain, no palpitations    PULMONARY: no dyspnea, cough, or congestion   GI: no vomiting, diarrhea  : no dysuria, back pain, or hematuria   Neuro: no weakness, numbness, aphasia, or headache  Musculoskeletal: no swelling, deformity, pain, or joint swelling  Endocrine: no fevers, sweating, or weight loss   SKIN: no rash, erythema, or contusions     See history of present illness. All other systems are negative. C.    PAST MEDICAL HISTORY   has a past medical history of Hypertension.    SURGICAL HISTORY  patient denies any surgical history    SOCIAL HISTORY  Social History     Tobacco Use   • Smoking status: Never Smoker   • Smokeless tobacco: Never Used   Substance Use Topics   • Alcohol use: Not Currently     Frequency: Monthly or less     Drinks per session: 1 or 2     Binge frequency: Never   • Drug use: Not Currently      Social History     Substance and Sexual Activity  "  Drug Use Not Currently       FAMILY HISTORY  Family History   Problem Relation Age of Onset   • Hypertension Mother    • Kidney Disease Mother    • Stroke Mother    • Hypertension Father        CURRENT MEDICATIONS  None    ALLERGIES  No Known Allergies    PHYSICAL EXAM  VITAL SIGNS: /94   Pulse 65   Temp 35.9 °C (96.6 °F) (Temporal)   Resp 18   Ht 1.702 m (5' 7\")   Wt (!) 131 kg (288 lb 12.8 oz)   SpO2 99%   BMI 45.23 kg/m²     Constitutional: Obese. Well developed, Well nourished, No acute distress, Non-toxic appearance.   HEENT: Normocephalic, Atraumatic,  external ears normal, pharynx pink,  Mucous  Membranes moist, No rhinorrhea or mucosal edema  Eyes: PERRL, EOMI, Conjunctiva normal, No discharge.   Neck: Normal range of motion, No tenderness, Supple, No stridor.   Lymphatic: No lymphadenopathy    Cardiovascular: Regular Rate and Rhythm, No murmurs,  rubs, or gallops.   Thorax & Lungs: Lungs clear to auscultation bilaterally, No respiratory distress, No wheezes, rhales or rhonchi, No chest wall tenderness.   Abdomen: Obese. Right upper quadrant tenderness to palpation. Bowel sounds normal, Soft, non distended, No pulsatile masses., no rebound guarding or peritoneal signs.   Skin: Warm, Dry, No erythema, No rash,   Back:  No CVA tenderness,  No spinal tenderness, bony crepitance, step offs, or instability.   Neurologic: Alert & oriented x 3, Normal motor function, Normal sensory function, No focal deficits noted. Normal reflexes. Normal Cranial Nerves.  Extremities: Equal, intact distal pulses, No cyanosis, clubbing or edema,  No tenderness.   Musculoskeletal: Good range of motion in all major joints. No tenderness to palpation or major deformities noted.     DIAGNOSTIC STUDIES / PROCEDURES    LABS  Results for orders placed or performed during the hospital encounter of 12/18/20   CBC WITH DIFFERENTIAL   Result Value Ref Range    WBC 12.9 (H) 4.8 - 10.8 K/uL    RBC 5.55 4.70 - 6.10 M/uL    " Hemoglobin 16.7 14.0 - 18.0 g/dL    Hematocrit 49.9 42.0 - 52.0 %    MCV 89.9 81.4 - 97.8 fL    MCH 30.1 27.0 - 33.0 pg    MCHC 33.5 (L) 33.7 - 35.3 g/dL    RDW 45.2 35.9 - 50.0 fL    Platelet Count 188 164 - 446 K/uL    MPV 12.4 9.0 - 12.9 fL    Neutrophils-Polys 73.60 (H) 44.00 - 72.00 %    Lymphocytes 16.50 (L) 22.00 - 41.00 %    Monocytes 6.30 0.00 - 13.40 %    Eosinophils 2.80 0.00 - 6.90 %    Basophils 0.30 0.00 - 1.80 %    Immature Granulocytes 0.50 0.00 - 0.90 %    Nucleated RBC 0.00 /100 WBC    Neutrophils (Absolute) 9.47 (H) 1.82 - 7.42 K/uL    Lymphs (Absolute) 2.12 1.00 - 4.80 K/uL    Monos (Absolute) 0.81 0.00 - 0.85 K/uL    Eos (Absolute) 0.36 0.00 - 0.51 K/uL    Baso (Absolute) 0.04 0.00 - 0.12 K/uL    Immature Granulocytes (abs) 0.06 0.00 - 0.11 K/uL    NRBC (Absolute) 0.00 K/uL   COMP METABOLIC PANEL   Result Value Ref Range    Sodium 137 135 - 145 mmol/L    Potassium 3.8 3.6 - 5.5 mmol/L    Chloride 100 96 - 112 mmol/L    Co2 28 20 - 33 mmol/L    Anion Gap 9.0 7.0 - 16.0    Glucose 131 (H) 65 - 99 mg/dL    Bun 21 8 - 22 mg/dL    Creatinine 0.86 0.50 - 1.40 mg/dL    Calcium 9.3 8.5 - 10.5 mg/dL    AST(SGOT) 26 12 - 45 U/L    ALT(SGPT) 162 (H) 2 - 50 U/L    Alkaline Phosphatase 105 (H) 30 - 99 U/L    Total Bilirubin 0.8 0.1 - 1.5 mg/dL    Albumin 4.5 3.2 - 4.9 g/dL    Total Protein 7.8 6.0 - 8.2 g/dL    Globulin 3.3 1.9 - 3.5 g/dL    A-G Ratio 1.4 g/dL   LIPASE   Result Value Ref Range    Lipase 63 11 - 82 U/L   ESTIMATED GFR   Result Value Ref Range    GFR If African American >60 >60 mL/min/1.73 m 2    GFR If Non African American >60 >60 mL/min/1.73 m 2   COVID/SARS CoV-2 PCR    Specimen: Nasopharyngeal; Respirate   Result Value Ref Range    COVID Order Status Received    CoV-2, Flu A/B, And RSV by PCR   Result Value Ref Range    Influenza virus A RNA Negative Negative    Influenza virus B, PCR Negative Negative    RSV, PCR Negative Negative    SARS-CoV-2 by PCR NotDetected     SARS-CoV-2 Source NP  Swab        All labs reviewed by me.    RADIOLOGY  US-RUQ   Final Result         1.  Common bile duct dilatation, concerning for distal obstructing stone, stenosis, or ampullary lesion. Further evaluation with ERCP or MRCP to exclude obstructing process recommended.   2.  Hepatomegaly   3.  Cholelithiasis without additional sonographic findings of acute cholecystitis        The radiologist's interpretation of all radiological studies have been reviewed by me.    COURSE & MEDICAL DECISION MAKING  Nursing notes, VS, PMSFHx reviewed in chart.    6:26 AM Patient seen and examined at bedside. I discussed my plan of care with the patient, which includes obtaining lab work and imaging for further evaluation. Patient understands and verbalizes agreement to plan of care. Patient will be treated with morphine 4 mg and ondansetron 4 mg. Ordered US-RUQ, CBC with diff, CMP, Lipase, and Estimated GFR to evaluate his symptoms. The differential diagnoses include but are not limited to: pancreatitis vs cholecystitis vs hepatitis vs pulmonary embolism vs fatty liver disease    8:10 AM - Patient was reevaluated at bedside. Discussed lab and radiology results with the patient and informed him of the plan for admission. Patient understands and verbalizes agreement to plan of care.     8:12 AM - Paged Surgery    8:32 AM - Surgery Responded. I discussed the patient's case and the above findings with Dr. Torres (Surgery) who agrees to evaluate the patient.     8:33 AM - Patient was reevaluated at bedside. I informed the patient of my consultation with Dr. Torres and the plan to remove his gallbladder. Patient understands and verbalizes agreement to plan of care.     PPE Note: I personally donned full PPE for all patient encounters during this visit, including being clean-shaven with an N95 respirator mask, gloves, and goggles.     Scribe remained outside the patient's room and did not have any contact with the patient for the duration of  patient encounter.     DISPOSITION:  Patient will be hospitalized by Dr. Torres in guarded condition.    FINAL IMPRESSION  1. Calculus of gallbladder with acute cholecystitis without obstruction        Shawna HAYDEN (Scribe), am scribing for, and in the presence of, Jackie Mendez M.D..    Electronically signed by: Shawna Collazo (Scribe), 12/18/2020    Jackie HAYDEN M.D. personally performed the services described in this documentation, as scribed by Shawna Collazo in my presence, and it is both accurate and complete.    C    The note accurately reflects work and decisions made by me.  Jackie Mendez M.D.  12/18/2020  1:00 PM

## 2020-12-18 NOTE — ANESTHESIA TIME REPORT
Anesthesia Start and Stop Event Times     Date Time Event    12/18/2020 01:27 PM Ready for Procedure    12/18/2020 01:31 PM Anesthesia Start    12/18/2020 02:34 PM Anesthesia Stop        Responsible Staff  12/18/20    Name Role Begin End    Rika Bailey M.D. Anesthesiologist 12/18/20 01:31 PM 12/18/20 02:34 PM        Preop Diagnosis (Free Text):  Pre-op Diagnosis     acute cholecystitis        Preop Diagnosis (Codes):    Post op Diagnosis  Acute cholecystitis      Premium Reason  Non-Premium    Comments: Lap. Cholecystectomy

## 2020-12-19 VITALS
OXYGEN SATURATION: 92 % | HEIGHT: 67 IN | SYSTOLIC BLOOD PRESSURE: 126 MMHG | WEIGHT: 299.16 LBS | RESPIRATION RATE: 16 BRPM | DIASTOLIC BLOOD PRESSURE: 85 MMHG | BODY MASS INDEX: 46.96 KG/M2 | HEART RATE: 88 BPM | TEMPERATURE: 98.7 F

## 2020-12-19 LAB
ALBUMIN SERPL BCP-MCNC: 3.9 G/DL (ref 3.2–4.9)
ALBUMIN/GLOB SERPL: 1.3 G/DL
ALP SERPL-CCNC: 82 U/L (ref 30–99)
ALT SERPL-CCNC: 120 U/L (ref 2–50)
ANION GAP SERPL CALC-SCNC: 11 MMOL/L (ref 7–16)
AST SERPL-CCNC: 28 U/L (ref 12–45)
BILIRUB SERPL-MCNC: 0.7 MG/DL (ref 0.1–1.5)
BUN SERPL-MCNC: 11 MG/DL (ref 8–22)
CALCIUM SERPL-MCNC: 9 MG/DL (ref 8.5–10.5)
CHLORIDE SERPL-SCNC: 102 MMOL/L (ref 96–112)
CO2 SERPL-SCNC: 23 MMOL/L (ref 20–33)
CREAT SERPL-MCNC: 0.64 MG/DL (ref 0.5–1.4)
GLOBULIN SER CALC-MCNC: 3.1 G/DL (ref 1.9–3.5)
GLUCOSE SERPL-MCNC: 121 MG/DL (ref 65–99)
POTASSIUM SERPL-SCNC: 4.2 MMOL/L (ref 3.6–5.5)
PROT SERPL-MCNC: 7 G/DL (ref 6–8.2)
SODIUM SERPL-SCNC: 136 MMOL/L (ref 135–145)

## 2020-12-19 PROCEDURE — A9270 NON-COVERED ITEM OR SERVICE: HCPCS | Performed by: NURSE PRACTITIONER

## 2020-12-19 PROCEDURE — 36415 COLL VENOUS BLD VENIPUNCTURE: CPT

## 2020-12-19 PROCEDURE — G0378 HOSPITAL OBSERVATION PER HR: HCPCS

## 2020-12-19 PROCEDURE — 80053 COMPREHEN METABOLIC PANEL: CPT

## 2020-12-19 PROCEDURE — 700102 HCHG RX REV CODE 250 W/ 637 OVERRIDE(OP): Performed by: NURSE PRACTITIONER

## 2020-12-19 RX ADMIN — OXYCODONE HYDROCHLORIDE 5 MG: 5 TABLET ORAL at 05:27

## 2020-12-19 ASSESSMENT — FIBROSIS 4 INDEX: FIB4 SCORE: 0.35

## 2020-12-19 ASSESSMENT — PAIN SCALES - GENERAL: PAIN_LEVEL: 5

## 2020-12-19 NOTE — PROGRESS NOTES
Pt has been discharged from the facility. Pt was able to tolerate a regular diet. Pt received discharge instructions and prescriptions, I reviewed all with him. I removed the patients IV. I walked the patient down to the [employer] entrance to his ride.

## 2020-12-19 NOTE — PROGRESS NOTES
Received report from PAC-U.  Pt arrived to T427 bed 1, pt able to stand pivot to bed  No immediate distress.  A&Ox4  Pain tolerable at this time. Ice pack in place  Denies n/v  5 abdominal lap sites with dressings, scant drainage noted  Oriented to room, white board, TV, call light, call before fall, and plan of care  Call light and personal belongings within reach.  Fall precautions and hourly rounding in place

## 2020-12-19 NOTE — DISCHARGE INSTRUCTIONS
Discharge Instructions    Remove bandage after 48 hours   May shower after bandage removed      Laparoscopic Cholecystectomy, Care After  This sheet gives you information about how to care for yourself after your procedure. Your doctor may also give you more specific instructions. If you have problems or questions, contact your doctor.  Follow these instructions at home:  Care for cuts from surgery (incisions)    · Follow instructions from your doctor about how to take care of your cuts from surgery. Make sure you:  ? Wash your hands with soap and water before you change your bandage (dressing). If you cannot use soap and water, use hand .  ? Change your bandage as told by your doctor.  ? Leave stitches (sutures), skin glue, or skin tape (adhesive) strips in place. They may need to stay in place for 2 weeks or longer. If tape strips get loose and curl up, you may trim the loose edges. Do not remove tape strips completely unless your doctor says it is okay.  · Do not take baths, swim, or use a hot tub until your doctor says it is okay. Ask your doctor if you can take showers. You may only be allowed to take sponge baths for bathing.  · Check your surgical cut area every day for signs of infection. Check for:  ? More redness, swelling, or pain.  ? More fluid or blood.  ? Warmth.  ? Pus or a bad smell.  Activity  · Do not drive or use heavy machinery while taking prescription pain medicine.  · Do not lift anything that is heavier than 10 lb (4.5 kg) until your doctor says it is okay.  · Do not play contact sports until your doctor says it is okay.  · Do not drive for 24 hours if you were given a medicine to help you relax (sedative).  · Rest as needed. Do not return to work or school until your doctor says it is okay.  General instructions  · Take over-the-counter and prescription medicines only as told by your doctor.  · To prevent or treat constipation while you are taking prescription pain medicine, your  doctor may recommend that you:  ? Drink enough fluid to keep your pee (urine) clear or pale yellow.  ? Take over-the-counter or prescription medicines.  ? Eat foods that are high in fiber, such as fresh fruits and vegetables, whole grains, and beans.  ? Limit foods that are high in fat and processed sugars, such as fried and sweet foods.  Contact a doctor if:  · You develop a rash.  · You have more redness, swelling, or pain around your surgical cuts.  · You have more fluid or blood coming from your surgical cuts.  · Your surgical cuts feel warm to the touch.  · You have pus or a bad smell coming from your surgical cuts.  · You have a fever.  · One or more of your surgical cuts breaks open.  Get help right away if:  · You have trouble breathing.  · You have chest pain.  · You have pain that is getting worse in your shoulders.  · You faint or feel dizzy when you stand.  · You have very bad pain in your belly (abdomen).  · You are sick to your stomach (nauseous) for more than one day.  · You have throwing up (vomiting) that lasts for more than one day.  · You have leg pain.  This information is not intended to replace advice given to you by your health care provider. Make sure you discuss any questions you have with your health care provider.  Document Released: 09/26/2009 Document Revised: 11/30/2018 Document Reviewed: 06/05/2017  [employer] Patient Education © 2020 [employer]        Discharged to home by car with relative. Discharged via walking, hospital escort: Yes.  Special equipment needed: Not Applicable    Be sure to schedule a follow-up appointment with your primary care doctor or any specialists as instructed.     Discharge Plan:   Influenza Vaccine Indication: Indicated: 9 to 64 years of age  Influenza Vaccine Given - only chart on this line when given: Influenza Vaccine Given (See MAR)    I understand that a diet low in cholesterol, fat, and sodium is recommended for good health. Unless I have been given  specific instructions below for another diet, I accept this instruction as my diet prescription.   Other diet: Clear liquids    Special Instructions: None    · Is patient discharged on Warfarin / Coumadin?   No       Gallbladder Eating Plan  If you have a gallbladder condition, you may have trouble digesting fats. Eating a low-fat diet can help reduce your symptoms, and may be helpful before and after having surgery to remove your gallbladder (cholecystectomy). Your health care provider may recommend that you work with a diet and nutrition specialist (dietitian) to help you reduce the amount of fat in your diet.  What are tips for following this plan?  General guidelines  · Limit your fat intake to less than 30% of your total daily calories. If you eat around 1,800 calories each day, this is less than 60 grams (g) of fat per day.  · Fat is an important part of a healthy diet. Eating a low-fat diet can make it hard to maintain a healthy body weight. Ask your dietitian how much fat, calories, and other nutrients you need each day.  · Eat small, frequent meals throughout the day instead of three large meals.  · Drink at least 8-10 cups of fluid a day. Drink enough fluid to keep your urine clear or pale yellow.  · Limit alcohol intake to no more than 1 drink a day for nonpregnant women and 2 drinks a day for men. One drink equals 12 oz of beer, 5 oz of wine, or 1½ oz of hard liquor.  Reading food labels  · Check Nutrition Facts on food labels for the amount of fat per serving. Choose foods with less than 3 grams of fat per serving.  Shopping  · Choose nonfat and low-fat healthy foods. Look for the words “nonfat,” “low fat,” or “fat free.”  · Avoid buying processed or prepackaged foods.  Cooking  · Cook using low-fat methods, such as baking, broiling, grilling, or boiling.  · Cook with small amounts of healthy fats, such as olive oil, grapeseed oil, canola oil, or sunflower oil.  What foods are recommended?    · All  fresh, frozen, or canned fruits and vegetables.  · Whole grains.  · Low-fat or non-fat (skim) milk and yogurt.  · Lean meat, skinless poultry, fish, eggs, and beans.  · Low-fat protein supplement powders or drinks.  · Spices and herbs.  What foods are not recommended?  · High-fat foods. These include baked goods, fast food, fatty cuts of meat, ice cream, french toast, sweet rolls, pizza, cheese bread, foods covered with butter, creamy sauces, or cheese.  · Fried foods. These include french fries, tempura, battered fish, breaded chicken, fried breads, and sweets.  · Foods with strong odors.  · Foods that cause bloating and gas.  Summary  · A low-fat diet can be helpful if you have a gallbladder condition, or before and after gallbladder surgery.  · Limit your fat intake to less than 30% of your total daily calories. This is about 60 g of fat if you eat 1,800 calories each day.  · Eat small, frequent meals throughout the day instead of three large meals.  This information is not intended to replace advice given to you by your health care provider. Make sure you discuss any questions you have with your health care provider.  Document Released: 12/23/2014 Document Revised: 04/09/2020 Document Reviewed: 01/25/2018  [employer] Patient Education © 2020 [employer]            Depression / Suicide Risk    As you are discharged from this [facility] facility, it is important to learn how to keep safe from harming yourself.    Recognize the warning signs:  · Abrupt changes in personality, positive or negative- including increase in energy   · Giving away possessions  · Change in eating patterns- significant weight changes-  positive or negative  · Change in sleeping patterns- unable to sleep or sleeping all the time   · Unwillingness or inability to communicate  · Depression  · Unusual sadness, discouragement and loneliness  · Talk of wanting to die  · Neglect of personal appearance   · Rebelliousness- reckless  behavior  · Withdrawal from people/activities they love  · Confusion- inability to concentrate     If you or a loved one observes any of these behaviors or has concerns about self-harm, here's what you can do:  · Talk about it- your feelings and reasons for harming yourself  · Remove any means that you might use to hurt yourself (examples: pills, rope, extension cords, firearm)  · Get professional help from the community (Mental Health, Substance Abuse, psychological counseling)  · Do not be alone:Call your Safe Contact- someone whom you trust who will be there for you.  · Call your local CRISIS HOTLINE 513-4379 or 792-763-9667  · Call your local Children's Mobile Crisis Response Team [place] (894) 788-8867 or www.LayerGloss  · Call the toll free National Suicide Prevention Hotlines   · National Suicide Prevention Lifeline 268-293-ILZB (6454)  · National Hope Line Network 800-SUICIDE (148-8725)

## 2020-12-20 NOTE — ANESTHESIA QCDR
2019 [facility] Clinical Data Registry (for Quality Improvement)     Postoperative nausea/vomiting risk protocol (Adult = 18 yrs and Pediatric 3-17 yrs)- (430 and 463)  General inhalation anesthetic (NOT TIVA) with PONV risk factors: No  Provision of anti-emetic therapy with at least 2 different classes of agents: N/A  Patient DID NOT receive anti-emetic therapy and reason is documented in Medical Record: N/A    Multimodal Pain Management- (477)  Non-emergent surgery AND patient age >= 18: No  Use of Multimodal Pain Management, two or more drugs and/or interventions, NOT including systemic opioids:   Exception: Documented allergy to multiple classes of analgesics:     Smoking Abstinence (404)  Patient is current smoker (cigarette, pipe, e-cig, marijuanna): No  Elective Surgery:   Abstinence instructions provided prior to day of surgery:   Patient abstained from smoking on day of surgery:     Pre-Op Beta-Blocker in Isolated CABG (44)  Isolated CABG AND patient age >= 18: No  Beta-blocker admin within 24 hours of surgical incision:   Exception:of medical reason(s) for not administering beta blocker within 24 hours prior to surgical incision (e.g., not  indicated,other medical reason):     PACU assessment of acute postoperative pain prior to Anesthesia Care End- Applies to Patients Age = 18- (ABG7)  Initial PACU pain score is which of the following: < 7/10  Patient unable to report pain score: N/A    Post-anesthetic transfer of care checklist/protocol to PACU/ICU- (426 and 427)  Upon conclusion of case, patient transferred to which of the following locations: PACU/Non-ICU  Use of transfer checklist/protocol: Yes  Exclusion: Service Performed in Patient Hospital Room (and thus did not require transfer): N/A  Unplanned admission to ICU related to anesthesia service up through end of PACU care- (MD51)  Unplanned admission to ICU (not initially anticipated at anesthesia start time): No

## 2020-12-20 NOTE — ANESTHESIA POSTPROCEDURE EVALUATION
Patient: Todd Goodrich    Procedure Summary     Date: 12/18/20 Room / Location: [facility] 09 / SURGERY [facility]    Anesthesia Start: 1331 Anesthesia Stop: 1434    Procedure: CHOLECYSTECTOMY, LAPAROSCOPIC (N/A Abdomen) Diagnosis: (acute cholecystitis)    Surgeons: Maik Torres M.D. Responsible Provider: Rika Bailey M.D.    Anesthesia Type: general ASA Status: 3          Final Anesthesia Type: general  Last vitals  BP   Blood Pressure: 126/85    Temp   37.1 °C (98.7 °F)    Pulse   Pulse: 88   Resp   16    SpO2   92 %      Anesthesia Post Evaluation    Patient location during evaluation: PACU  Patient participation: complete - patient participated  Level of consciousness: awake and alert  Pain score: 5    Airway patency: patent  Anesthetic complications: no  Cardiovascular status: hemodynamically stable  Respiratory status: acceptable  Hydration status: euvolemic    PONV: none           Nurse Pain Score: 5 (NPRS)
